# Patient Record
Sex: MALE | Race: WHITE | NOT HISPANIC OR LATINO | Employment: OTHER | URBAN - METROPOLITAN AREA
[De-identification: names, ages, dates, MRNs, and addresses within clinical notes are randomized per-mention and may not be internally consistent; named-entity substitution may affect disease eponyms.]

---

## 2018-10-02 ENCOUNTER — OFFICE VISIT (OUTPATIENT)
Dept: UROLOGY | Facility: CLINIC | Age: 68
End: 2018-10-02
Payer: MEDICARE

## 2018-10-02 VITALS
HEART RATE: 90 BPM | WEIGHT: 190.6 LBS | DIASTOLIC BLOOD PRESSURE: 77 MMHG | SYSTOLIC BLOOD PRESSURE: 123 MMHG | BODY MASS INDEX: 28.89 KG/M2 | HEIGHT: 68 IN | OXYGEN SATURATION: 94 %

## 2018-10-02 DIAGNOSIS — C61 MALIGNANT NEOPLASM OF PROSTATE (H): Primary | ICD-10-CM

## 2018-10-02 DIAGNOSIS — R39.9 LOWER URINARY TRACT SYMPTOMS (LUTS): ICD-10-CM

## 2018-10-02 DIAGNOSIS — E78.00 HIGH CHOLESTEROL: ICD-10-CM

## 2018-10-02 DIAGNOSIS — C61 PROSTATE CANCER (H): Primary | ICD-10-CM

## 2018-10-02 DIAGNOSIS — M19.90 OSTEOARTHRITIS, UNSPECIFIED OSTEOARTHRITIS TYPE, UNSPECIFIED SITE: ICD-10-CM

## 2018-10-02 DIAGNOSIS — D12.6 ADENOMATOUS POLYP OF COLON, UNSPECIFIED PART OF COLON: ICD-10-CM

## 2018-10-02 PROCEDURE — 99204 OFFICE O/P NEW MOD 45 MIN: CPT | Performed by: UROLOGY

## 2018-10-02 RX ORDER — NAPROXEN SODIUM 220 MG
220 TABLET ORAL AT BEDTIME
COMMUNITY

## 2018-10-02 RX ORDER — TAMSULOSIN HYDROCHLORIDE 0.4 MG/1
0.4 CAPSULE ORAL 2 TIMES DAILY
Status: ON HOLD | COMMUNITY
Start: 2018-07-23 | End: 2018-10-12

## 2018-10-02 RX ORDER — POLYETHYLENE GLYCOL 3350 17 G/17G
17 POWDER, FOR SOLUTION ORAL DAILY PRN
COMMUNITY
Start: 2010-04-28

## 2018-10-02 RX ORDER — ACETAMINOPHEN 500 MG
1000 TABLET ORAL PRN
Status: ON HOLD | COMMUNITY
End: 2018-10-12

## 2018-10-02 RX ORDER — SIMVASTATIN 20 MG
10 TABLET ORAL DAILY
Status: ON HOLD | COMMUNITY
Start: 2018-07-21 | End: 2018-10-12

## 2018-10-02 ASSESSMENT — PAIN SCALES - GENERAL: PAINLEVEL: NO PAIN (0)

## 2018-10-02 NOTE — NURSING NOTE
"Pedro Dumont's goals for this visit include:   Chief Complaint   Patient presents with     Consult     Prostate cancer       He requests these members of his care team be copied on today's visit information: Yes    PCP: Gerald Casarez    Referring Provider:  Gerald Casarez  [facility]  [address] [place] [address]    /77  Pulse 90  Ht 1.727 m (5' 8\")  Wt 86.5 kg (190 lb 9.6 oz)  SpO2 94%  BMI 28.98 kg/m2    Do you need any medication refills at today's visit? No    "

## 2018-10-02 NOTE — PROGRESS NOTES
Urology Consult History and Physical  BHAVYA FULLER   Name: Pedro Dumont    MRN: [id number]   YOB: 1950       We were asked to see Pedro Dumont at the request of Dr. Casarez for evaluation and treatment of Prostate cancer.        Chief Complaint:   Prostate cancer    History is obtained from the patient, wife, and outside records            History of Present Illness:   Pedro Dumont is a 67 year old male who is being seen for evaluation of prostate cancer.    He is a very pleasant 68 y/o man who was recently diagnosed with [place] 5+4=9 prostate cancer, PSA 9.1, T1c disease. He was worked up and diagnosed in [place] and scheduled for RALP, BPLND on 10/29 with Dr. Mccarty. He presents today for a second opinion and hopes of an earlier surgery date.     Biopsy done on 8/10 with Dr. Dior:  Volume 43.2    Positive 5/6 on the RIGHT and 2/6 on the LEFT  5+4=9 on left apex 40%  3+3=6 in 6 other biopsies (RB, RM, RA, RB, RA, LA)    Some questionable areas on Bone Scan: posterior 9th rib, cervical, thoracic, and lumbar spine - CT shows areas of degeneration in the spine and no evidence of osseous of lymph node mets. No MRI done.     PSA from outside records:  10/2009     0.9  10/2010     1.56  05/2014     2.04  05/2015     2.54  05/2016     3.97  05/2017     5.85  10/2017     5.93  06/2018     9.14      AUASS: 3-1-0-0-2-1-1 = 8  QOL = Mostly satisfied  SVITLANA = 25/25    (4 or >)  Location: Prostate   Quality: Cancer  Severity: [place] 5+4=9, high risk disease   Duration: Diagnosed in August           Past Medical History:     Past Medical History:   Diagnosis Date     Adenomatous polyp of colon      High cholesterol      Lower urinary tract symptoms (LUTS)      Osteoarthritis      Prostate cancer (H)             Past Surgical History:     Past Surgical History:   Procedure Laterality Date     PAST SURGICAL HISTORY  2004, 2011    Bilateral hip replacement     PAST SURGICAL HISTORY Right 1998    Knee replacement  "           Social History:     Social History   Substance Use Topics     Smoking status: Never Smoker     Smokeless tobacco: Never Used     Alcohol use 3.0 oz/week     5 Cans of beer per week       History   Smoking Status     Never Smoker   Smokeless Tobacco     Never Used            Family History:     Family History   Problem Relation Age of Onset     Arthritis Mother      HEART DISEASE Father               Allergies:   No Known Allergies         Medications:     Current Outpatient Prescriptions   Medication Sig     acetaminophen (TYLENOL) 500 MG tablet Take 1,000 mg by mouth as needed     aspirin 81 MG tablet Take 1 tablet by mouth daily     Calcium Carbonate-Vit D-Min (CALCIUM 1200 PO) Take 1 tablet by mouth daily     Glucosamine-Chondroit-Vit C-Mn (GLUCOSAMINE 1500 COMPLEX PO) Take 2 tablets by mouth daily     Multiple Vitamins-Minerals (CENTRUM CARDIO PO) Take 2 tablets by mouth daily     naproxen sodium (ANAPROX) 220 MG tablet Take 440 mg by mouth as needed     polyethylene glycol (MIRALAX/GLYCOLAX) powder Take 17 g by mouth as needed     simvastatin (ZOCOR) 20 MG tablet Take 10 mg by mouth daily     tamsulosin (FLOMAX) 0.4 MG capsule Take 0.4 mg by mouth daily     No current facility-administered medications for this visit.              Review of Systems:     Skin: negative  Eyes: negative  Ears/Nose/Throat: negative  Respiratory: No shortness of breath, dyspnea on exertion, cough, or hemoptysis  Cardiovascular: negative  Gastrointestinal: negative  Genitourinary: as above  Musculoskeletal: Joint replacement  Neurologic: negative  Psychiatric: negative  Hematologic/Lymphatic/Immunologic: negative  Endocrine: negative          Physical Exam:     Patient Vitals for the past 24 hrs:   BP Pulse SpO2 Height Weight   10/02/18 1357 123/77 90 94 % 1.727 m (5' 8\") 86.5 kg (190 lb 9.6 oz)     Body mass index is 28.98 kg/(m^2).     General: age-appropriate appearing male in NAD  HEENT: Head AT/NC, EOMI, CN Grossly " intact  Lungs: no respiratory distress, or pursed lip breathing  Heart: No obvious jugular venous distension present  Back: no bony midline tenderness, no CVAT bilaterally.  Abdomen: soft, non-distended, non-tender. No organomegaly  : deferred to OR  Lymph: no palpable inguinal lymphadenopathy.  LE: no edema.   Musculoskeltal: extremities normal, no peripheral edema  Skin: no suspicious lesions or rashes  Neuro: Alert, oriented, speech and mentation normal; moving all 4 extremities equally.  Psych: affect and mood normal          Data:   All laboratory data reviewed:  Outside records reviewed from [facility]  More than 60 pages of records personally reviewed    PSA from outside records:  10/2009     0.9  10/2010     1.56  05/2014     2.04  05/2015     2.54  05/2016     3.97  05/2017     5.85  10/2017     5.93  06/2018     9.14    ALK P'TASE  8/31/2018     69    SCr  8/24/2018     0.92      Prostate biopsy pathology:  DIAGNOSIS:  PROSTATE NEEDLE BIOPSIES  A.  RIGHT LATERAL BASE       --INVASIVE ADENOCARCINOMA, RAFAEL SCORE 3+3=6 (GRADE GROUP 1),          40% OF SUBMITTED TISSUE  B.  RIGHT LATERAL MID       --INVASIVE ADENOCARCINOMA, RAFAEL SCORE 3+3=6 (GRADE GROUP 1),          30% OF SUBMITTED TISSUE  C.  RIGHT LATERAL APEX       --INVASIVE ADENOCARCINOMA, RAFAEL SCORE 3+3=6 (GRADE GROUP 1),          60% OF SUBMITTED TISSUE  D.  RIGHT BASE       --INVASIVE ADENOCARCINOMA, RAFAEL SCORE 3+3=6 (GRADE GROUP 1),          <5% OF SUBMITTED TISSUE  F.  RIGHT APEX       --INVASIVE ADENOCARCINOMA, RAFAEL SCORE 3+3=6 (GRADE GROUP 1),          15% OF SUBMITTED TISSUE  I.  LEFT LATERAL APEX       --INVASIVE ADENOCARCINOMA, RAFAEL SCORE 3+3=6 (GRADE GROUP 1),          <5% OF SUBMITTED TISSUE  L.  LEFT APEX       --INVASIVE ADENOCARCINOMA, RAFAEL SCORE 5+4=9 (GRADE GROUP 5),          40% OF SUBMITTED TISSUE      E.  RIGHT MID - Benign prostatic tissue  G.  LEFT LATERAL BASE - Benign prostatic tissue  H.  LEFT LATERAL MID -  Benign prostatic tissue   J.  LEFT BASE - Benign prostatic tissue  K.  LEFT MID - Benign prostatic tissue    All pertinent imaging reviewed:  Imaging reports from [facility] reviewed    NM Bone Scan 8/24/2018  FINDINGS:  Total body bone scan is performed.    There are foci of increased activity seen at both shoulders, the  sternoclavicular joints, and both knees.  These areas correspond with likely  degenerative change.  There is photopenia is seen the left hip corresponding  with previous arthroplasty.  There is some mild photopenia at the joint space  of the right hip an additional hardware may be present at this level.    There is a focus of in key increased activity seen on the left in the mid  cervical spine and on the left in the upper thoracic spine.  Additional focus  of activity is seen along the posterior aspect of the right 9th rib.  Additional activity is seen at the right aspect of L4 and L5.  These areas of  activity in the spine may be degenerative.  However, correlation with other  imaging, such as radiographs, would be recommended to assure this.    IMPRESSION:  Multiple areas of degenerative change.    Foci of activity in the cervical, thoracic and lumbar spine as well as in the  posterior right 9th rib.  These may be degenerative in nature.  However,  correlation with a radiographs or recent previous imaging would be recommended.    CT ABD/PEL 8/24/2018  FINDINGS:  There is significant spine degenerative changes including mid to lower thoracic  spine lumbar spine which a could account for increased activity on the bone  scan.        Calcified lung granulomas.  Mild bibasilar linear atelectasis/scarring.  Normal  appearing liver gallbladder kidneys pancreas and spleen.  There is a small  splenule.  Tiny renal cyst.  Patient has a bilateral hip total arthroplasties  and there is significant atrophy of left iliopsoas.  Small umbilical hernia  containing fat..  The total hip arthroplasties obscure the  prostate and  portions of the internal iliac akilah regions.  Negative for convincing internal  external common iliac or periaortic adenopathy.  Diverticulosis.    IMPRESSION:  Spine degenerative changes appear to account for nuclear medicine bone activity  without convincing osseous or akilah metastatic disease.           Impression and Plan:   Impression:   68 y/o man with high risk prostate cancer. [place] 5+4=9 in single core from LEFT apex, rest of cores 3+3=6, PSA 9.1. Metastatic evaluation negative.      Plan:   High risk prostate cancer    DISCUSSION PROSTATE CANCER     The natural history of this disease was explained to the patient at length and the treatment options discussed including radical prostatectomy by open or robotic-assisted laparoscopic approach, including the possible need for post surgery radiation, including the probability of success and complications associated with these approaches.    With radiation therapy, we discussed the difficulties in diagnosing recurrent disease at an early stage due to variability in PSA levels and the fact that most patients are not candidates for local salvage therapy when biochemical recurrence is declared.  We also discussed the significant morbidity of local salvage therapy in terms of perioperative complications, erectile dysfunction and urinary incontinence.    With respect to radical prostatectomy, the pros and cons of open vs robotic-assisted laparoscopic prostatectomy were discussed. The complications of this procedure were reviewed with the patient and include (but not limited to) urinary incontinence, erectile dysfunction, infertility, anastomotic stricture, lymphocele, hemorrhage requiring transfusion, rectal, ureteral, or nerve injury, infection, cardiovascular, pulmonary, thromboembolic, and anesthetic complications.  For robotic prostatectomy, the additional complications of anastomotic urine leak and small bowel obstruction from adhesions was  conveyed. The anticipated post-operative course was explained, including an anticipated 1-2 day(s) hospital stay.    With surgery, we also discussed the potential advantage over radiation therapy in that biochemical recurrence can be detected at a relatively earlier stage and that salvage radiotherapy is successful in controlling recurrent disease in a substantial proportion of patients.  I also conveyed that salvage radiotherapy was associated with a considerably more favorable morbidity profile compared to local salvage therapies for radiorecurent disease.     All questions were answered.  I asked him to call if any additional questions or concerns or if he wanted to proceed with therapy with us.    We discussed that we could offer a surgery date of 10/12 at [facility].     Patient has decided he would like to proceed with Robotic Radical Prostatectomy - 85978    The risks, benefits, alternatives, and personnel involved in the procudure were discussed.  All questions were answered.  A written informed consent will be finalized on the morning of the procedure.    Plan:  - We will work to obtain images from [facility]  - We will discuss obtaining a prostate MRI for pre-op planning         Thank you for the kind consultation.    Time spent: 45 minutes of which >50% was spent counseling.    Tristin Hughes MD   Urology  [facility]  [facility] Phone: [phone]  [facility] Phone: [phone]

## 2018-10-02 NOTE — LETTER
10/2/2018         RE: Pedro Dumont  [address] [place] [id number]        Dear Colleague,    Thank you for referring your patient, Pedro Dumont, to the [email or web address] Please see a copy of my visit note below.    Urology Consult History and Physical  BHAVYA GROVE   Name: Pedro Dumont    MRN: [id number]   YOB: 1950       We were asked to see Pedro Dumont at the request of Dr. Casarez for evaluation and treatment of Prostate cancer.        Chief Complaint:   Prostate cancer    History is obtained from the patient, wife, and outside records            History of Present Illness:   Pedro Dumont is a 67 year old male who is being seen for evaluation of prostate cancer.    He is a very pleasant 66 y/o man who was recently diagnosed with [place] 5+4=9 prostate cancer, PSA 9.1, T1c disease. He was worked up and diagnosed in [place] and scheduled for RALP, BPLND on 10/29 with Dr. Mccarty. He presents today for a second opinion and hopes of an earlier surgery date.     Biopsy done on 8/10 with Dr. Dior:  Volume 43.2    Positive 5/6 on the RIGHT and 2/6 on the LEFT  5+4=9 on left apex 40%  3+3=6 in 6 other biopsies (RB, RM, RA, RB, RA, LA)    Some questionable areas on Bone Scan: posterior 9th rib, cervical, thoracic, and lumbar spine - CT shows areas of degeneration in the spine and no evidence of osseous of lymph node mets. No MRI done.     PSA from outside records:  10/2009     0.9  10/2010     1.56  05/2014     2.04  05/2015     2.54  05/2016     3.97  05/2017     5.85  10/2017     5.93  06/2018     9.14      AUASS: 3-1-0-0-2-1-1 = 8  QOL = Mostly satisfied  SVITLANA = 25/25    (4 or >)  Location: Prostate   Quality: Cancer  Severity: [place] 5+4=9, high risk disease   Duration: Diagnosed in August           Past Medical History:     Past Medical History:   Diagnosis Date     Adenomatous polyp of colon      High cholesterol      Lower urinary tract symptoms (LUTS)       Osteoarthritis      Prostate cancer (H)             Past Surgical History:     Past Surgical History:   Procedure Laterality Date     PAST SURGICAL HISTORY  2004, 2011    Bilateral hip replacement     PAST SURGICAL HISTORY Right 1998    Knee replacement            Social History:     Social History   Substance Use Topics     Smoking status: Never Smoker     Smokeless tobacco: Never Used     Alcohol use 3.0 oz/week     5 Cans of beer per week       History   Smoking Status     Never Smoker   Smokeless Tobacco     Never Used            Family History:     Family History   Problem Relation Age of Onset     Arthritis Mother      HEART DISEASE Father               Allergies:   No Known Allergies         Medications:     Current Outpatient Prescriptions   Medication Sig     acetaminophen (TYLENOL) 500 MG tablet Take 1,000 mg by mouth as needed     aspirin 81 MG tablet Take 1 tablet by mouth daily     Calcium Carbonate-Vit D-Min (CALCIUM 1200 PO) Take 1 tablet by mouth daily     Glucosamine-Chondroit-Vit C-Mn (GLUCOSAMINE 1500 COMPLEX PO) Take 2 tablets by mouth daily     Multiple Vitamins-Minerals (CENTRUM CARDIO PO) Take 2 tablets by mouth daily     naproxen sodium (ANAPROX) 220 MG tablet Take 440 mg by mouth as needed     polyethylene glycol (MIRALAX/GLYCOLAX) powder Take 17 g by mouth as needed     simvastatin (ZOCOR) 20 MG tablet Take 10 mg by mouth daily     tamsulosin (FLOMAX) 0.4 MG capsule Take 0.4 mg by mouth daily     No current facility-administered medications for this visit.              Review of Systems:     Skin: negative  Eyes: negative  Ears/Nose/Throat: negative  Respiratory: No shortness of breath, dyspnea on exertion, cough, or hemoptysis  Cardiovascular: negative  Gastrointestinal: negative  Genitourinary: as above  Musculoskeletal: Joint replacement  Neurologic: negative  Psychiatric: negative  Hematologic/Lymphatic/Immunologic: negative  Endocrine: negative          Physical Exam:     Patient  "Vitals for the past 24 hrs:   BP Pulse SpO2 Height Weight   10/02/18 1357 123/77 90 94 % 1.727 m (5' 8\") 86.5 kg (190 lb 9.6 oz)     Body mass index is 28.98 kg/(m^2).     General: age-appropriate appearing male in NAD  HEENT: Head AT/NC, EOMI, CN Grossly intact  Lungs: no respiratory distress, or pursed lip breathing  Heart: No obvious jugular venous distension present  Back: no bony midline tenderness, no CVAT bilaterally.  Abdomen: soft, non-distended, non-tender. No organomegaly  : deferred to OR  Lymph: no palpable inguinal lymphadenopathy.  LE: no edema.   Musculoskeltal: extremities normal, no peripheral edema  Skin: no suspicious lesions or rashes  Neuro: Alert, oriented, speech and mentation normal; moving all 4 extremities equally.  Psych: affect and mood normal          Data:   All laboratory data reviewed:  Outside records reviewed from [facility]  More than 60 pages of records personally reviewed    PSA from outside records:  10/2009     0.9  10/2010     1.56  05/2014     2.04  05/2015     2.54  05/2016     3.97  05/2017     5.85  10/2017     5.93  06/2018     9.14    ALK P'TASE  8/31/2018     69    SCr  8/24/2018     0.92      Prostate biopsy pathology:  DIAGNOSIS:  PROSTATE NEEDLE BIOPSIES  A.  RIGHT LATERAL BASE       --INVASIVE ADENOCARCINOMA, RAFAEL SCORE 3+3=6 (GRADE GROUP 1),          40% OF SUBMITTED TISSUE  B.  RIGHT LATERAL MID       --INVASIVE ADENOCARCINOMA, RAFAEL SCORE 3+3=6 (GRADE GROUP 1),          30% OF SUBMITTED TISSUE  C.  RIGHT LATERAL APEX       --INVASIVE ADENOCARCINOMA, RAFAEL SCORE 3+3=6 (GRADE GROUP 1),          60% OF SUBMITTED TISSUE  D.  RIGHT BASE       --INVASIVE ADENOCARCINOMA, RAFAEL SCORE 3+3=6 (GRADE GROUP 1),          <5% OF SUBMITTED TISSUE  F.  RIGHT APEX       --INVASIVE ADENOCARCINOMA, RAFAEL SCORE 3+3=6 (GRADE GROUP 1),          15% OF SUBMITTED TISSUE  I.  LEFT LATERAL APEX       --INVASIVE ADENOCARCINOMA, RAFAEL SCORE 3+3=6 (GRADE GROUP 1),          <5% " OF SUBMITTED TISSUE  L.  LEFT APEX       --INVASIVE ADENOCARCINOMA, RAFAEL SCORE 5+4=9 (GRADE GROUP 5),          40% OF SUBMITTED TISSUE      E.  RIGHT MID - Benign prostatic tissue  G.  LEFT LATERAL BASE - Benign prostatic tissue  H.  LEFT LATERAL MID - Benign prostatic tissue   J.  LEFT BASE - Benign prostatic tissue  K.  LEFT MID - Benign prostatic tissue    All pertinent imaging reviewed:  Imaging reports from [facility] reviewed    NM Bone Scan 8/24/2018  FINDINGS:  Total body bone scan is performed.    There are foci of increased activity seen at both shoulders, the  sternoclavicular joints, and both knees.  These areas correspond with likely  degenerative change.  There is photopenia is seen the left hip corresponding  with previous arthroplasty.  There is some mild photopenia at the joint space  of the right hip an additional hardware may be present at this level.    There is a focus of in key increased activity seen on the left in the mid  cervical spine and on the left in the upper thoracic spine.  Additional focus  of activity is seen along the posterior aspect of the right 9th rib.  Additional activity is seen at the right aspect of L4 and L5.  These areas of  activity in the spine may be degenerative.  However, correlation with other  imaging, such as radiographs, would be recommended to assure this.    IMPRESSION:  Multiple areas of degenerative change.    Foci of activity in the cervical, thoracic and lumbar spine as well as in the  posterior right 9th rib.  These may be degenerative in nature.  However,  correlation with a radiographs or recent previous imaging would be recommended.    CT ABD/PEL 8/24/2018  FINDINGS:  There is significant spine degenerative changes including mid to lower thoracic  spine lumbar spine which a could account for increased activity on the bone  scan.        Calcified lung granulomas.  Mild bibasilar linear atelectasis/scarring.  Normal  appearing liver gallbladder  kidneys pancreas and spleen.  There is a small  splenule.  Tiny renal cyst.  Patient has a bilateral hip total arthroplasties  and there is significant atrophy of left iliopsoas.  Small umbilical hernia  containing fat..  The total hip arthroplasties obscure the prostate and  portions of the internal iliac akilah regions.  Negative for convincing internal  external common iliac or periaortic adenopathy.  Diverticulosis.    IMPRESSION:  Spine degenerative changes appear to account for nuclear medicine bone activity  without convincing osseous or akilah metastatic disease.           Impression and Plan:   Impression:   66 y/o man with high risk prostate cancer. [place] 5+4=9 in single core from LEFT apex, rest of cores 3+3=6, PSA 9.1. Metastatic evaluation negative.      Plan:   High risk prostate cancer    DISCUSSION PROSTATE CANCER     The natural history of this disease was explained to the patient at length and the treatment options discussed including radical prostatectomy by open or robotic-assisted laparoscopic approach, including the possible need for post surgery radiation, including the probability of success and complications associated with these approaches.    With radiation therapy, we discussed the difficulties in diagnosing recurrent disease at an early stage due to variability in PSA levels and the fact that most patients are not candidates for local salvage therapy when biochemical recurrence is declared.  We also discussed the significant morbidity of local salvage therapy in terms of perioperative complications, erectile dysfunction and urinary incontinence.    With respect to radical prostatectomy, the pros and cons of open vs robotic-assisted laparoscopic prostatectomy were discussed. The complications of this procedure were reviewed with the patient and include (but not limited to) urinary incontinence, erectile dysfunction, infertility, anastomotic stricture, lymphocele, hemorrhage requiring  transfusion, rectal, ureteral, or nerve injury, infection, cardiovascular, pulmonary, thromboembolic, and anesthetic complications.  For robotic prostatectomy, the additional complications of anastomotic urine leak and small bowel obstruction from adhesions was conveyed. The anticipated post-operative course was explained, including an anticipated 1-2 day(s) hospital stay.    With surgery, we also discussed the potential advantage over radiation therapy in that biochemical recurrence can be detected at a relatively earlier stage and that salvage radiotherapy is successful in controlling recurrent disease in a substantial proportion of patients.  I also conveyed that salvage radiotherapy was associated with a considerably more favorable morbidity profile compared to local salvage therapies for radiorecurent disease.     All questions were answered.  I asked him to call if any additional questions or concerns or if he wanted to proceed with therapy with us.    We discussed that we could offer a surgery date of 10/12 at [facility].     Patient has decided he would like to proceed with Robotic Radical Prostatectomy - 54412    The risks, benefits, alternatives, and personnel involved in the procudure were discussed.  All questions were answered.  A written informed consent will be finalized on the morning of the procedure.    Plan:  - We will work to obtain images from [facility]  - We will discuss obtaining a prostate MRI for pre-op planning         Thank you for the kind consultation.    Time spent: 45 minutes of which >50% was spent counseling.    Tristin Hughes MD   Urology  [facility]  [facility] Phone: [phone]  [facility] Phone: [phone]         Again, thank you for allowing me to participate in the care of your patient.        Sincerely,        Tristin Hughes MD

## 2018-10-02 NOTE — MR AVS SNAPSHOT
After Visit Summary   10/2/2018    Pedro Dumont    MRN: [id number]           Patient Information     Date Of Birth          1950        Visit Information        Provider Department      10/2/2018 2:00 PM Tristin Hughes MD [facility]        Today's Diagnoses     Prostate cancer (H)    -  1    Lower urinary tract symptoms (LUTS)        Osteoarthritis, unspecified osteoarthritis type, unspecified site        High cholesterol        Adenomatous polyp of colon, unspecified part of colon           Follow-ups after your visit        Your next 10 appointments already scheduled     Oct 12, 2018   Procedure with Tristin Hughes MD   [facility] (--)    [address]  [facility] [id number] [phone]            Oct 24, 2018 10:30 AM CDT   Return Visit with Tristin Hughes MD   [facility] ([facility])    [address]  [address]  [facility] [id number] [phone]              Who to contact     If you have questions or need follow up information about today's clinic visit or your schedule please contact [facility] directly at [phone].  Normal or non-critical lab and imaging results will be communicated to you by MyChart, letter or phone within 4 business days after the clinic has received the results. If you do not hear from us within 7 days, please contact the clinic through [employer] or phone. If you have a critical or abnormal lab result, we will notify you by phone as soon as possible.  Submit refill requests through FanXT or call your pharmacy and they will forward the refill request to us. Please allow 3 business days for your refill to be completed.          Additional Information About Your Visit        [employer] Information     FanXT is an electronic gateway that provides easy, online access to your medical records. With FanXT, you can request a clinic  "appointment, read your test results, renew a prescription or communicate with your care team.     To sign up for [employer] visit the website at [email or web address]   You will be asked to enter the access code listed below, as well as some personal information. Please follow the directions to create your username and password.     Your access code is: [email or web address]  Expires: 2018  8:52 PM     Your access code will  in 90 days. If you need help or a new code, please contact your [facility] or call [phone] for assistance.        Care EveryWhere ID     This is your Care EveryWhere ID. This could be used by other organizations to access your [place] medical records  [id number]        Your Vitals Were     Pulse Height Pulse Oximetry BMI (Body Mass Index)          90 1.727 m (5' 8\") 94% 28.98 kg/m2         Blood Pressure from Last 3 Encounters:   10/02/18 123/77    Weight from Last 3 Encounters:   10/02/18 86.5 kg (190 lb 9.6 oz)              Today, you had the following     No orders found for display       Primary Care Provider Office Phone # Fax #    Gerald Casarez [phone]       [place] [address]        Equal Access to Services     MARK ANTHONY JOSE AH: Hadii quentin ku hadasho Soomaali, waaxda luqadaha, qaybta kaalmada adeegyada, waxay idiin hayaan fabrice ha . So [facility] [phone].    ATENCIÓN: Si habla español, tiene a deluna disposición servicios gratuitos de asistencia lingüística. Llame al [phone].    We comply with applicable federal civil rights laws and [place] laws. We do not discriminate on the basis of race, color, national origin, age, disability, sex, sexual orientation, or gender identity.            Thank you!     Thank you for choosing [facility]  for your care. Our goal is always to provide you with excellent care. Hearing back from our patients is one way we can continue to " improve our services. Please take a few minutes to complete the written survey that you may receive in the mail after your visit with us. Thank you!             Your Updated Medication List - Protect others around you: Learn how to safely use, store and throw away your medicines at www.disposemymeds.org.          This list is accurate as of 10/2/18  8:52 PM.  Always use your most recent med list.                   Brand Name Dispense Instructions for use Diagnosis    acetaminophen 500 MG tablet    TYLENOL     Take 1,000 mg by mouth as needed        aspirin 81 MG tablet      Take 1 tablet by mouth daily        CALCIUM 1200 PO      Take 1 tablet by mouth daily        CENTRUM CARDIO PO      Take 2 tablets by mouth daily        GLUCOSAMINE 1500 COMPLEX PO      Take 2 tablets by mouth daily        naproxen sodium 220 MG tablet    ANAPROX     Take 440 mg by mouth as needed        polyethylene glycol powder    MIRALAX/GLYCOLAX     Take 17 g by mouth as needed        simvastatin 20 MG tablet    ZOCOR     Take 10 mg by mouth daily        tamsulosin 0.4 MG capsule    FLOMAX     Take 0.4 mg by mouth daily

## 2018-10-12 ENCOUNTER — SURGERY (OUTPATIENT)
Age: 68
End: 2018-10-12

## 2018-10-12 ENCOUNTER — HOSPITAL ENCOUNTER (OUTPATIENT)
Facility: CLINIC | Age: 68
Discharge: HOME | End: 2018-10-13
Attending: UROLOGY | Admitting: UROLOGY
Payer: MEDICARE

## 2018-10-12 ENCOUNTER — ANESTHESIA EVENT (OUTPATIENT)
Dept: SURGERY | Facility: CLINIC | Age: 68
End: 2018-10-12
Payer: MEDICARE

## 2018-10-12 ENCOUNTER — ANESTHESIA (OUTPATIENT)
Dept: SURGERY | Facility: CLINIC | Age: 68
End: 2018-10-12
Payer: MEDICARE

## 2018-10-12 DIAGNOSIS — C61 PROSTATE CANCER (H): Primary | ICD-10-CM

## 2018-10-12 LAB
ABO + RH BLD: NORMAL
ABO + RH BLD: NORMAL
ANION GAP SERPL CALCULATED.3IONS-SCNC: 7 MMOL/L (ref 3–14)
BLD GP AB SCN SERPL QL: NORMAL
BLOOD BANK CMNT PATIENT-IMP: NORMAL
BUN SERPL-MCNC: 15 MG/DL (ref 7–30)
CALCIUM SERPL-MCNC: 8.1 MG/DL (ref 8.5–10.1)
CHLORIDE SERPL-SCNC: 111 MMOL/L (ref 94–109)
CO2 SERPL-SCNC: 26 MMOL/L (ref 20–32)
CREAT SERPL-MCNC: 1.01 MG/DL (ref 0.66–1.25)
ERYTHROCYTE [DISTWIDTH] IN BLOOD BY AUTOMATED COUNT: 13.1 % (ref 10–15)
GFR SERPL CREATININE-BSD FRML MDRD: 73 ML/MIN/1.7M2
GLUCOSE SERPL-MCNC: 144 MG/DL (ref 70–99)
HCT VFR BLD AUTO: 39.2 % (ref 40–53)
HGB BLD-MCNC: 13.2 G/DL (ref 13.3–17.7)
MCH RBC QN AUTO: 30.9 PG (ref 26.5–33)
MCHC RBC AUTO-ENTMCNC: 33.7 G/DL (ref 31.5–36.5)
MCV RBC AUTO: 92 FL (ref 78–100)
PLATELET # BLD AUTO: 210 10E9/L (ref 150–450)
POTASSIUM SERPL-SCNC: 4 MMOL/L (ref 3.4–5.3)
RBC # BLD AUTO: 4.27 10E12/L (ref 4.4–5.9)
SODIUM SERPL-SCNC: 144 MMOL/L (ref 133–144)
SPECIMEN EXP DATE BLD: NORMAL
WBC # BLD AUTO: 14.9 10E9/L (ref 4–11)

## 2018-10-12 PROCEDURE — 88309 TISSUE EXAM BY PATHOLOGIST: CPT | Mod: 26 | Performed by: UROLOGY

## 2018-10-12 PROCEDURE — 27210794 ZZH OR GENERAL SUPPLY STERILE: Performed by: UROLOGY

## 2018-10-12 PROCEDURE — 25000128 H RX IP 250 OP 636: Performed by: NURSE ANESTHETIST, CERTIFIED REGISTERED

## 2018-10-12 PROCEDURE — 25000132 ZZH RX MED GY IP 250 OP 250 PS 637: Mod: GY | Performed by: STUDENT IN AN ORGANIZED HEALTH CARE EDUCATION/TRAINING PROGRAM

## 2018-10-12 PROCEDURE — 25000125 ZZHC RX 250: Performed by: UROLOGY

## 2018-10-12 PROCEDURE — 36000085 ZZH SURGERY LEVEL 8 1ST 30 MIN: Performed by: UROLOGY

## 2018-10-12 PROCEDURE — 25000128 H RX IP 250 OP 636: Performed by: ANESTHESIOLOGY

## 2018-10-12 PROCEDURE — 55866 LAPS SURG PRST8ECT RPBIC RAD: CPT | Performed by: UROLOGY

## 2018-10-12 PROCEDURE — 25000566 ZZH SEVOFLURANE, EA 15 MIN: Performed by: UROLOGY

## 2018-10-12 PROCEDURE — 25000125 ZZHC RX 250: Performed by: ANESTHESIOLOGY

## 2018-10-12 PROCEDURE — 36415 COLL VENOUS BLD VENIPUNCTURE: CPT | Performed by: UROLOGY

## 2018-10-12 PROCEDURE — 25000125 ZZHC RX 250: Performed by: NURSE ANESTHETIST, CERTIFIED REGISTERED

## 2018-10-12 PROCEDURE — 80048 BASIC METABOLIC PNL TOTAL CA: CPT | Performed by: STUDENT IN AN ORGANIZED HEALTH CARE EDUCATION/TRAINING PROGRAM

## 2018-10-12 PROCEDURE — 38571 LAPAROSCOPY LYMPHADENECTOMY: CPT | Mod: 51 | Performed by: UROLOGY

## 2018-10-12 PROCEDURE — 37000008 ZZH ANESTHESIA TECHNICAL FEE, 1ST 30 MIN: Performed by: UROLOGY

## 2018-10-12 PROCEDURE — 37000009 ZZH ANESTHESIA TECHNICAL FEE, EACH ADDTL 15 MIN: Performed by: UROLOGY

## 2018-10-12 PROCEDURE — 71000012 ZZH RECOVERY PHASE 1 LEVEL 1 FIRST HR: Performed by: UROLOGY

## 2018-10-12 PROCEDURE — 86900 BLOOD TYPING SEROLOGIC ABO: CPT | Performed by: UROLOGY

## 2018-10-12 PROCEDURE — 36000087 ZZH SURGERY LEVEL 8 EA 15 ADDTL MIN: Performed by: UROLOGY

## 2018-10-12 PROCEDURE — 88331 PATH CONSLTJ SURG 1 BLK 1SPC: CPT | Performed by: UROLOGY

## 2018-10-12 PROCEDURE — 88305 TISSUE EXAM BY PATHOLOGIST: CPT | Performed by: UROLOGY

## 2018-10-12 PROCEDURE — 86901 BLOOD TYPING SEROLOGIC RH(D): CPT | Performed by: UROLOGY

## 2018-10-12 PROCEDURE — A9270 NON-COVERED ITEM OR SERVICE: HCPCS | Mod: GY | Performed by: STUDENT IN AN ORGANIZED HEALTH CARE EDUCATION/TRAINING PROGRAM

## 2018-10-12 PROCEDURE — 85027 COMPLETE CBC AUTOMATED: CPT | Performed by: STUDENT IN AN ORGANIZED HEALTH CARE EDUCATION/TRAINING PROGRAM

## 2018-10-12 PROCEDURE — 36415 COLL VENOUS BLD VENIPUNCTURE: CPT | Performed by: STUDENT IN AN ORGANIZED HEALTH CARE EDUCATION/TRAINING PROGRAM

## 2018-10-12 PROCEDURE — 88305 TISSUE EXAM BY PATHOLOGIST: CPT | Mod: 26 | Performed by: UROLOGY

## 2018-10-12 PROCEDURE — 25800025 ZZH RX 258: Performed by: UROLOGY

## 2018-10-12 PROCEDURE — 88331 PATH CONSLTJ SURG 1 BLK 1SPC: CPT | Mod: 26 | Performed by: UROLOGY

## 2018-10-12 PROCEDURE — 25000128 H RX IP 250 OP 636: Performed by: UROLOGY

## 2018-10-12 PROCEDURE — 88309 TISSUE EXAM BY PATHOLOGIST: CPT | Performed by: UROLOGY

## 2018-10-12 PROCEDURE — 86850 RBC ANTIBODY SCREEN: CPT | Performed by: UROLOGY

## 2018-10-12 PROCEDURE — 71000013 ZZH RECOVERY PHASE 1 LEVEL 1 EA ADDTL HR: Performed by: UROLOGY

## 2018-10-12 PROCEDURE — 25000128 H RX IP 250 OP 636: Performed by: STUDENT IN AN ORGANIZED HEALTH CARE EDUCATION/TRAINING PROGRAM

## 2018-10-12 PROCEDURE — 40000170 ZZH STATISTIC PRE-PROCEDURE ASSESSMENT II: Performed by: UROLOGY

## 2018-10-12 RX ORDER — SODIUM CHLORIDE, SODIUM LACTATE, POTASSIUM CHLORIDE, CALCIUM CHLORIDE 600; 310; 30; 20 MG/100ML; MG/100ML; MG/100ML; MG/100ML
INJECTION, SOLUTION INTRAVENOUS CONTINUOUS
Status: DISCONTINUED | OUTPATIENT
Start: 2018-10-12 | End: 2018-10-13 | Stop reason: HOSPADM

## 2018-10-12 RX ORDER — SODIUM CHLORIDE, SODIUM LACTATE, POTASSIUM CHLORIDE, CALCIUM CHLORIDE 600; 310; 30; 20 MG/100ML; MG/100ML; MG/100ML; MG/100ML
INJECTION, SOLUTION INTRAVENOUS CONTINUOUS
Status: DISCONTINUED | OUTPATIENT
Start: 2018-10-12 | End: 2018-10-12 | Stop reason: HOSPADM

## 2018-10-12 RX ORDER — CEFAZOLIN SODIUM 1 G/3ML
1 INJECTION, POWDER, FOR SOLUTION INTRAMUSCULAR; INTRAVENOUS SEE ADMIN INSTRUCTIONS
Status: DISCONTINUED | OUTPATIENT
Start: 2018-10-12 | End: 2018-10-12 | Stop reason: HOSPADM

## 2018-10-12 RX ORDER — POLYETHYLENE GLYCOL 3350 17 G/17G
17 POWDER, FOR SOLUTION ORAL DAILY
Status: DISCONTINUED | OUTPATIENT
Start: 2018-10-12 | End: 2018-10-13 | Stop reason: HOSPADM

## 2018-10-12 RX ORDER — BUPIVACAINE HYDROCHLORIDE 2.5 MG/ML
INJECTION, SOLUTION INFILTRATION; PERINEURAL PRN
Status: DISCONTINUED | OUTPATIENT
Start: 2018-10-12 | End: 2018-10-12 | Stop reason: HOSPADM

## 2018-10-12 RX ORDER — HEPARIN SODIUM 5000 [USP'U]/.5ML
5000 INJECTION, SOLUTION INTRAVENOUS; SUBCUTANEOUS ONCE
Status: COMPLETED | OUTPATIENT
Start: 2018-10-12 | End: 2018-10-12

## 2018-10-12 RX ORDER — GLYCOPYRROLATE 0.2 MG/ML
INJECTION, SOLUTION INTRAMUSCULAR; INTRAVENOUS PRN
Status: DISCONTINUED | OUTPATIENT
Start: 2018-10-12 | End: 2018-10-12

## 2018-10-12 RX ORDER — OXYCODONE HYDROCHLORIDE 5 MG/1
5 TABLET ORAL EVERY 6 HOURS PRN
Qty: 20 TABLET | Refills: 0 | Status: SHIPPED | OUTPATIENT
Start: 2018-10-12

## 2018-10-12 RX ORDER — ACETAMINOPHEN 325 MG/1
975 TABLET ORAL EVERY 8 HOURS
Status: DISCONTINUED | OUTPATIENT
Start: 2018-10-12 | End: 2018-10-13 | Stop reason: HOSPADM

## 2018-10-12 RX ORDER — NEOSTIGMINE METHYLSULFATE 1 MG/ML
VIAL (ML) INJECTION PRN
Status: DISCONTINUED | OUTPATIENT
Start: 2018-10-12 | End: 2018-10-12

## 2018-10-12 RX ORDER — PROPOFOL 10 MG/ML
INJECTION, EMULSION INTRAVENOUS PRN
Status: DISCONTINUED | OUTPATIENT
Start: 2018-10-12 | End: 2018-10-12

## 2018-10-12 RX ORDER — ONDANSETRON 4 MG/1
4 TABLET, ORALLY DISINTEGRATING ORAL EVERY 30 MIN PRN
Status: DISCONTINUED | OUTPATIENT
Start: 2018-10-12 | End: 2018-10-12 | Stop reason: HOSPADM

## 2018-10-12 RX ORDER — CIPROFLOXACIN 500 MG/1
500 TABLET, FILM COATED ORAL ONCE
Qty: 1 TABLET | Refills: 0 | Status: SHIPPED | OUTPATIENT
Start: 2018-10-12 | End: 2018-10-12

## 2018-10-12 RX ORDER — HEPARIN SODIUM 5000 [USP'U]/.5ML
5000 INJECTION, SOLUTION INTRAVENOUS; SUBCUTANEOUS EVERY 12 HOURS
Status: DISCONTINUED | OUTPATIENT
Start: 2018-10-12 | End: 2018-10-13 | Stop reason: HOSPADM

## 2018-10-12 RX ORDER — EPHEDRINE SULFATE 50 MG/ML
INJECTION, SOLUTION INTRAMUSCULAR; INTRAVENOUS; SUBCUTANEOUS PRN
Status: DISCONTINUED | OUTPATIENT
Start: 2018-10-12 | End: 2018-10-12

## 2018-10-12 RX ORDER — HYDROMORPHONE HYDROCHLORIDE 1 MG/ML
.3-.5 INJECTION, SOLUTION INTRAMUSCULAR; INTRAVENOUS; SUBCUTANEOUS EVERY 5 MIN PRN
Status: DISCONTINUED | OUTPATIENT
Start: 2018-10-12 | End: 2018-10-12 | Stop reason: HOSPADM

## 2018-10-12 RX ORDER — KETOROLAC TROMETHAMINE 5 MG/ML
1 SOLUTION OPHTHALMIC 2 TIMES DAILY
Status: DISCONTINUED | OUTPATIENT
Start: 2018-10-12 | End: 2018-10-13 | Stop reason: HOSPADM

## 2018-10-12 RX ORDER — ONDANSETRON 2 MG/ML
4 INJECTION INTRAMUSCULAR; INTRAVENOUS EVERY 6 HOURS PRN
Status: DISCONTINUED | OUTPATIENT
Start: 2018-10-12 | End: 2018-10-13 | Stop reason: HOSPADM

## 2018-10-12 RX ORDER — DEXAMETHASONE SODIUM PHOSPHATE 4 MG/ML
INJECTION, SOLUTION INTRA-ARTICULAR; INTRALESIONAL; INTRAMUSCULAR; INTRAVENOUS; SOFT TISSUE PRN
Status: DISCONTINUED | OUTPATIENT
Start: 2018-10-12 | End: 2018-10-12

## 2018-10-12 RX ORDER — ONDANSETRON 2 MG/ML
INJECTION INTRAMUSCULAR; INTRAVENOUS PRN
Status: DISCONTINUED | OUTPATIENT
Start: 2018-10-12 | End: 2018-10-12

## 2018-10-12 RX ORDER — CEFAZOLIN SODIUM 2 G/100ML
2 INJECTION, SOLUTION INTRAVENOUS
Status: COMPLETED | OUTPATIENT
Start: 2018-10-12 | End: 2018-10-12

## 2018-10-12 RX ORDER — ONDANSETRON 2 MG/ML
4 INJECTION INTRAMUSCULAR; INTRAVENOUS EVERY 30 MIN PRN
Status: DISCONTINUED | OUTPATIENT
Start: 2018-10-12 | End: 2018-10-12 | Stop reason: HOSPADM

## 2018-10-12 RX ORDER — HYDRALAZINE HYDROCHLORIDE 20 MG/ML
2.5-5 INJECTION INTRAMUSCULAR; INTRAVENOUS EVERY 10 MIN PRN
Status: DISCONTINUED | OUTPATIENT
Start: 2018-10-12 | End: 2018-10-12 | Stop reason: HOSPADM

## 2018-10-12 RX ORDER — AMOXICILLIN 250 MG
1 CAPSULE ORAL 2 TIMES DAILY
Qty: 60 TABLET | Refills: 1 | Status: SHIPPED | OUTPATIENT
Start: 2018-10-12

## 2018-10-12 RX ORDER — LIDOCAINE 40 MG/G
CREAM TOPICAL
Status: DISCONTINUED | OUTPATIENT
Start: 2018-10-12 | End: 2018-10-13 | Stop reason: HOSPADM

## 2018-10-12 RX ORDER — FENTANYL CITRATE 50 UG/ML
25-50 INJECTION, SOLUTION INTRAMUSCULAR; INTRAVENOUS
Status: DISCONTINUED | OUTPATIENT
Start: 2018-10-12 | End: 2018-10-12 | Stop reason: HOSPADM

## 2018-10-12 RX ORDER — PROCHLORPERAZINE MALEATE 5 MG
5 TABLET ORAL EVERY 6 HOURS PRN
Status: DISCONTINUED | OUTPATIENT
Start: 2018-10-12 | End: 2018-10-13 | Stop reason: HOSPADM

## 2018-10-12 RX ORDER — ONDANSETRON 4 MG/1
4 TABLET, ORALLY DISINTEGRATING ORAL EVERY 6 HOURS PRN
Status: DISCONTINUED | OUTPATIENT
Start: 2018-10-12 | End: 2018-10-13 | Stop reason: HOSPADM

## 2018-10-12 RX ORDER — MEPERIDINE HYDROCHLORIDE 25 MG/ML
12.5 INJECTION INTRAMUSCULAR; INTRAVENOUS; SUBCUTANEOUS EVERY 5 MIN PRN
Status: DISCONTINUED | OUTPATIENT
Start: 2018-10-12 | End: 2018-10-12 | Stop reason: HOSPADM

## 2018-10-12 RX ORDER — SODIUM CHLORIDE, SODIUM LACTATE, POTASSIUM CHLORIDE, CALCIUM CHLORIDE 600; 310; 30; 20 MG/100ML; MG/100ML; MG/100ML; MG/100ML
INJECTION, SOLUTION INTRAVENOUS CONTINUOUS PRN
Status: DISCONTINUED | OUTPATIENT
Start: 2018-10-12 | End: 2018-10-12

## 2018-10-12 RX ORDER — OFLOXACIN 3 MG/ML
1 SOLUTION/ DROPS OPHTHALMIC 4 TIMES DAILY
Status: DISCONTINUED | OUTPATIENT
Start: 2018-10-12 | End: 2018-10-13 | Stop reason: HOSPADM

## 2018-10-12 RX ORDER — OXYCODONE HYDROCHLORIDE 5 MG/1
5-10 TABLET ORAL
Status: DISCONTINUED | OUTPATIENT
Start: 2018-10-12 | End: 2018-10-13 | Stop reason: HOSPADM

## 2018-10-12 RX ORDER — NALOXONE HYDROCHLORIDE 0.4 MG/ML
.1-.4 INJECTION, SOLUTION INTRAMUSCULAR; INTRAVENOUS; SUBCUTANEOUS
Status: DISCONTINUED | OUTPATIENT
Start: 2018-10-12 | End: 2018-10-13 | Stop reason: HOSPADM

## 2018-10-12 RX ORDER — MAGNESIUM HYDROXIDE 1200 MG/15ML
LIQUID ORAL PRN
Status: DISCONTINUED | OUTPATIENT
Start: 2018-10-12 | End: 2018-10-12 | Stop reason: HOSPADM

## 2018-10-12 RX ORDER — DOCUSATE SODIUM 100 MG/1
100 CAPSULE, LIQUID FILLED ORAL 2 TIMES DAILY
Status: DISCONTINUED | OUTPATIENT
Start: 2018-10-12 | End: 2018-10-13 | Stop reason: HOSPADM

## 2018-10-12 RX ORDER — FENTANYL CITRATE 50 UG/ML
INJECTION, SOLUTION INTRAMUSCULAR; INTRAVENOUS PRN
Status: DISCONTINUED | OUTPATIENT
Start: 2018-10-12 | End: 2018-10-12

## 2018-10-12 RX ORDER — LIDOCAINE HYDROCHLORIDE 20 MG/ML
INJECTION, SOLUTION INFILTRATION; PERINEURAL PRN
Status: DISCONTINUED | OUTPATIENT
Start: 2018-10-12 | End: 2018-10-12

## 2018-10-12 RX ORDER — VECURONIUM BROMIDE 1 MG/ML
INJECTION, POWDER, LYOPHILIZED, FOR SOLUTION INTRAVENOUS PRN
Status: DISCONTINUED | OUTPATIENT
Start: 2018-10-12 | End: 2018-10-12

## 2018-10-12 RX ORDER — ALBUTEROL SULFATE 0.83 MG/ML
2.5 SOLUTION RESPIRATORY (INHALATION) EVERY 4 HOURS PRN
Status: DISCONTINUED | OUTPATIENT
Start: 2018-10-12 | End: 2018-10-12 | Stop reason: HOSPADM

## 2018-10-12 RX ORDER — NALOXONE HYDROCHLORIDE 0.4 MG/ML
.1-.4 INJECTION, SOLUTION INTRAMUSCULAR; INTRAVENOUS; SUBCUTANEOUS
Status: CANCELLED | OUTPATIENT
Start: 2018-10-12 | End: 2018-10-13

## 2018-10-12 RX ADMIN — POLYETHYLENE GLYCOL 3350 17 G: 17 POWDER, FOR SOLUTION ORAL at 22:42

## 2018-10-12 RX ADMIN — Medication 5 MG: at 11:43

## 2018-10-12 RX ADMIN — VECURONIUM BROMIDE 2 MG: 1 INJECTION, POWDER, LYOPHILIZED, FOR SOLUTION INTRAVENOUS at 14:29

## 2018-10-12 RX ADMIN — VECURONIUM BROMIDE 2 MG: 1 INJECTION, POWDER, LYOPHILIZED, FOR SOLUTION INTRAVENOUS at 13:39

## 2018-10-12 RX ADMIN — CEFAZOLIN SODIUM 2 G: 2 INJECTION, SOLUTION INTRAVENOUS at 11:45

## 2018-10-12 RX ADMIN — ACETAMINOPHEN 975 MG: 325 TABLET, FILM COATED ORAL at 20:14

## 2018-10-12 RX ADMIN — PHENYLEPHRINE HYDROCHLORIDE 100 MCG: 10 INJECTION, SOLUTION INTRAMUSCULAR; INTRAVENOUS; SUBCUTANEOUS at 11:52

## 2018-10-12 RX ADMIN — PHENYLEPHRINE HYDROCHLORIDE 100 MCG: 10 INJECTION, SOLUTION INTRAMUSCULAR; INTRAVENOUS; SUBCUTANEOUS at 14:07

## 2018-10-12 RX ADMIN — SODIUM CHLORIDE, POTASSIUM CHLORIDE, SODIUM LACTATE AND CALCIUM CHLORIDE: 600; 310; 30; 20 INJECTION, SOLUTION INTRAVENOUS at 18:05

## 2018-10-12 RX ADMIN — Medication 5 MG: at 15:25

## 2018-10-12 RX ADMIN — PHENYLEPHRINE HYDROCHLORIDE 100 MCG: 10 INJECTION, SOLUTION INTRAMUSCULAR; INTRAVENOUS; SUBCUTANEOUS at 12:46

## 2018-10-12 RX ADMIN — PHENYLEPHRINE HYDROCHLORIDE 100 MCG: 10 INJECTION, SOLUTION INTRAMUSCULAR; INTRAVENOUS; SUBCUTANEOUS at 13:55

## 2018-10-12 RX ADMIN — VECURONIUM BROMIDE 3 MG: 1 INJECTION, POWDER, LYOPHILIZED, FOR SOLUTION INTRAVENOUS at 13:56

## 2018-10-12 RX ADMIN — GLYCOPYRROLATE 0.8 MG: 0.2 INJECTION, SOLUTION INTRAMUSCULAR; INTRAVENOUS at 15:45

## 2018-10-12 RX ADMIN — SODIUM CHLORIDE 1000 ML: 900 IRRIGANT IRRIGATION at 12:11

## 2018-10-12 RX ADMIN — DEXAMETHASONE SODIUM PHOSPHATE 4 MG: 4 INJECTION, SOLUTION INTRA-ARTICULAR; INTRALESIONAL; INTRAMUSCULAR; INTRAVENOUS; SOFT TISSUE at 11:58

## 2018-10-12 RX ADMIN — SODIUM CHLORIDE, POTASSIUM CHLORIDE, SODIUM LACTATE AND CALCIUM CHLORIDE: 600; 310; 30; 20 INJECTION, SOLUTION INTRAVENOUS at 16:30

## 2018-10-12 RX ADMIN — DEXMEDETOMIDINE HYDROCHLORIDE 0.3 MCG/KG/HR: 100 INJECTION, SOLUTION INTRAVENOUS at 11:58

## 2018-10-12 RX ADMIN — PHENYLEPHRINE HYDROCHLORIDE 100 MCG: 10 INJECTION, SOLUTION INTRAMUSCULAR; INTRAVENOUS; SUBCUTANEOUS at 11:57

## 2018-10-12 RX ADMIN — BUPIVACAINE HYDROCHLORIDE 30 ML: 2.5 INJECTION, SOLUTION EPIDURAL; INFILTRATION; INTRACAUDAL; PERINEURAL at 15:34

## 2018-10-12 RX ADMIN — Medication 0.5 MG: at 16:38

## 2018-10-12 RX ADMIN — VECURONIUM BROMIDE 2 MG: 1 INJECTION, POWDER, LYOPHILIZED, FOR SOLUTION INTRAVENOUS at 14:48

## 2018-10-12 RX ADMIN — SODIUM CHLORIDE, POTASSIUM CHLORIDE, SODIUM LACTATE AND CALCIUM CHLORIDE: 600; 310; 30; 20 INJECTION, SOLUTION INTRAVENOUS at 10:30

## 2018-10-12 RX ADMIN — CEFAZOLIN SODIUM 1 G: 2 INJECTION, SOLUTION INTRAVENOUS at 15:38

## 2018-10-12 RX ADMIN — Medication 5 MG: at 12:07

## 2018-10-12 RX ADMIN — SODIUM CHLORIDE, POTASSIUM CHLORIDE, SODIUM LACTATE AND CALCIUM CHLORIDE: 600; 310; 30; 20 INJECTION, SOLUTION INTRAVENOUS at 13:30

## 2018-10-12 RX ADMIN — Medication 5 MG: at 13:04

## 2018-10-12 RX ADMIN — PHENYLEPHRINE HYDROCHLORIDE 100 MCG: 10 INJECTION, SOLUTION INTRAMUSCULAR; INTRAVENOUS; SUBCUTANEOUS at 13:13

## 2018-10-12 RX ADMIN — HEPARIN SODIUM 5000 UNITS: 5000 INJECTION, SOLUTION INTRAVENOUS; SUBCUTANEOUS at 22:42

## 2018-10-12 RX ADMIN — PHENYLEPHRINE HYDROCHLORIDE 100 MCG: 10 INJECTION, SOLUTION INTRAMUSCULAR; INTRAVENOUS; SUBCUTANEOUS at 13:40

## 2018-10-12 RX ADMIN — PHENYLEPHRINE HYDROCHLORIDE 200 MCG: 10 INJECTION, SOLUTION INTRAMUSCULAR; INTRAVENOUS; SUBCUTANEOUS at 15:38

## 2018-10-12 RX ADMIN — LIDOCAINE HYDROCHLORIDE 100 MG: 20 INJECTION, SOLUTION INFILTRATION; PERINEURAL at 11:37

## 2018-10-12 RX ADMIN — VECURONIUM BROMIDE 2 MG: 1 INJECTION, POWDER, LYOPHILIZED, FOR SOLUTION INTRAVENOUS at 12:04

## 2018-10-12 RX ADMIN — VECURONIUM BROMIDE 2 MG: 1 INJECTION, POWDER, LYOPHILIZED, FOR SOLUTION INTRAVENOUS at 13:25

## 2018-10-12 RX ADMIN — PHENYLEPHRINE HYDROCHLORIDE 100 MCG: 10 INJECTION, SOLUTION INTRAMUSCULAR; INTRAVENOUS; SUBCUTANEOUS at 13:22

## 2018-10-12 RX ADMIN — Medication 5 MG: at 14:04

## 2018-10-12 RX ADMIN — KETOROLAC TROMETHAMINE 1 DROP: 5 SOLUTION OPHTHALMIC at 20:00

## 2018-10-12 RX ADMIN — DOCUSATE SODIUM 100 MG: 100 CAPSULE, LIQUID FILLED ORAL at 22:42

## 2018-10-12 RX ADMIN — PHENYLEPHRINE HYDROCHLORIDE 100 MCG: 10 INJECTION, SOLUTION INTRAMUSCULAR; INTRAVENOUS; SUBCUTANEOUS at 12:01

## 2018-10-12 RX ADMIN — HEPARIN SODIUM 5000 UNITS: 5000 INJECTION, SOLUTION INTRAVENOUS; SUBCUTANEOUS at 12:18

## 2018-10-12 RX ADMIN — PHENYLEPHRINE HYDROCHLORIDE 100 MCG: 10 INJECTION, SOLUTION INTRAMUSCULAR; INTRAVENOUS; SUBCUTANEOUS at 13:02

## 2018-10-12 RX ADMIN — PROPOFOL 150 MG: 10 INJECTION, EMULSION INTRAVENOUS at 11:37

## 2018-10-12 RX ADMIN — PHENYLEPHRINE HYDROCHLORIDE 100 MCG: 10 INJECTION, SOLUTION INTRAMUSCULAR; INTRAVENOUS; SUBCUTANEOUS at 15:25

## 2018-10-12 RX ADMIN — GLYCOPYRROLATE 0.2 MG: 0.2 INJECTION, SOLUTION INTRAMUSCULAR; INTRAVENOUS at 12:22

## 2018-10-12 RX ADMIN — PHENYLEPHRINE HYDROCHLORIDE 0.25 MCG/KG/MIN: 10 INJECTION, SOLUTION INTRAMUSCULAR; INTRAVENOUS; SUBCUTANEOUS at 13:18

## 2018-10-12 RX ADMIN — Medication 5 MG: at 14:37

## 2018-10-12 RX ADMIN — ONDANSETRON 4 MG: 2 INJECTION INTRAMUSCULAR; INTRAVENOUS at 15:39

## 2018-10-12 RX ADMIN — NEOSTIGMINE METHYLSULFATE 4.5 MG: 1 INJECTION, SOLUTION INTRAVENOUS at 15:45

## 2018-10-12 RX ADMIN — PHENYLEPHRINE HYDROCHLORIDE 200 MCG: 10 INJECTION, SOLUTION INTRAMUSCULAR; INTRAVENOUS; SUBCUTANEOUS at 11:42

## 2018-10-12 RX ADMIN — OFLOXACIN 1 DROP: 3 SOLUTION/ DROPS OPHTHALMIC at 20:15

## 2018-10-12 RX ADMIN — ATROPA BELLADONNA AND OPIUM 30 MG: 16.2; 3 SUPPOSITORY RECTAL at 15:34

## 2018-10-12 RX ADMIN — VECURONIUM BROMIDE 2 MG: 1 INJECTION, POWDER, LYOPHILIZED, FOR SOLUTION INTRAVENOUS at 13:00

## 2018-10-12 RX ADMIN — PHENYLEPHRINE HYDROCHLORIDE 100 MCG: 10 INJECTION, SOLUTION INTRAMUSCULAR; INTRAVENOUS; SUBCUTANEOUS at 12:55

## 2018-10-12 RX ADMIN — FENTANYL CITRATE 50 MCG: 50 INJECTION, SOLUTION INTRAMUSCULAR; INTRAVENOUS at 11:37

## 2018-10-12 RX ADMIN — PHENYLEPHRINE HYDROCHLORIDE 200 MCG: 10 INJECTION, SOLUTION INTRAMUSCULAR; INTRAVENOUS; SUBCUTANEOUS at 15:28

## 2018-10-12 RX ADMIN — MIDAZOLAM 1 MG: 1 INJECTION INTRAMUSCULAR; INTRAVENOUS at 11:32

## 2018-10-12 RX ADMIN — VECURONIUM BROMIDE 3 MG: 1 INJECTION, POWDER, LYOPHILIZED, FOR SOLUTION INTRAVENOUS at 11:55

## 2018-10-12 RX ADMIN — VECURONIUM BROMIDE 1 MG: 1 INJECTION, POWDER, LYOPHILIZED, FOR SOLUTION INTRAVENOUS at 15:02

## 2018-10-12 RX ADMIN — ROCURONIUM BROMIDE 50 MG: 10 INJECTION INTRAVENOUS at 11:37

## 2018-10-12 RX ADMIN — PHENYLEPHRINE HYDROCHLORIDE 100 MCG: 10 INJECTION, SOLUTION INTRAMUSCULAR; INTRAVENOUS; SUBCUTANEOUS at 12:09

## 2018-10-12 NOTE — ANESTHESIA PREPROCEDURE EVALUATION
Anesthesia Evaluation     . Pt has had prior anesthetic.     No history of anesthetic complications          ROS/MED HX    ENT/Pulmonary:      (-) sleep apnea   Neurologic:       Cardiovascular:     (+) Dyslipidemia, ----. : . . . :. . Previous cardiac testing date:results:date: results:ECG reviewed date: results:NSR date: results:          METS/Exercise Tolerance:     Hematologic:         Musculoskeletal:         GI/Hepatic:        (-) GERD   Renal/Genitourinary:         Endo:         Psychiatric:         Infectious Disease:         Malignancy:   (+) Malignancy History of Prostate          Other:                     Physical Exam  Normal systems: cardiovascular and pulmonary    Airway   Mallampati: II  TM distance: >3 FB  Neck ROM: full    Dental   (+) chipped    Cardiovascular       Pulmonary    breath sounds clear to auscultation                    Anesthesia Plan      History & Physical Review  History and physical reviewed and following examination; no interval change.    ASA Status:  2 .    NPO Status:  > 8 hours    Plan for General and ETT with Intravenous induction. Maintenance will be Balanced.    PONV prophylaxis:  Ondansetron (or other 5HT-3) and Dexamethasone or Solumedrol       Postoperative Care  Postoperative pain management:  IV analgesics and Oral pain medications.      Consents  Anesthetic plan, risks, benefits and alternatives discussed with:  Patient..                          .

## 2018-10-12 NOTE — PROGRESS NOTES
Admission medication history interview status for the 10/12/2018  admission is complete. See EPIC admission navigator for prior to admission medications     Medication history source reliability:Moderate    Medication history interview source(s):Patient and Family    Medication history resources (including written lists, pill bottles, clinic record):Verified Simvastatin with [facility]    Primary pharmacy.[facility]    Additional medication history information not noted on PTA med list :None    Time spent in this activity: 40 minutes    Prior to Admission medications    Medication Sig Last Dose Taking? Auth Provider   Acetaminophen (TYLENOL PO) Take 1,000 mg by mouth every morning 10/10/2018 at AM Yes Reported, Patient   aspirin 81 MG tablet Take 81 mg by mouth daily  9/21/2018 at AM Yes Reported, Patient   Calcium Carbonate-Vit D-Min (CALCIUM 1200 PO) Take 1 tablet by mouth daily 10/10/2018 at AM Yes Reported, Patient   DiphenhydrAMINE HCl (BENADRYL PO) Take 12.5-25 mg by mouth nightly as needed 10/10/2018 at HS Yes Reported, Patient   Glucosamine-Chondroit-Vit C-Mn (GLUCOSAMINE 1500 COMPLEX PO) Take 2 tablets by mouth daily 9/21/2018 at AM Yes Reported, Patient   Multiple Vitamins-Minerals (CENTRUM CARDIO PO) Take 1 tablet by mouth daily  10/10/2018 at AM Yes Reported, Patient   naproxen sodium (ANAPROX) 220 MG tablet Take 220 mg by mouth At Bedtime  10/6/2018 at HS Yes Reported, Patient   OVER-THE-COUNTER Place 1 drop into both eyes daily as needed (Dry Eyes) ROHTO Dry eye aid    Povidone 0.68% - Lubricant  Propylene glycol 0.3% - Lubricant 10/11/2018 at AM Yes Reported, Patient   polyethylene glycol (MIRALAX/GLYCOLAX) powder Take 17 g by mouth daily as needed  10/11/2018 at PM Yes Reported, Patient   SIMVASTATIN PO Take 10 mg by mouth daily (0.5 x 20 mg = 10 mg dose) 10/11/2018 at AM Yes Reported, Patient   tamsulosin (FLOMAX) 0.4 MG capsule Take 0.4 mg by mouth 2 times daily  10/10/2018 at PM Yes Reported, Patient

## 2018-10-12 NOTE — IP AVS SNAPSHOT
[address]    [place] [address]    Phone:  [phone]                                       After Visit Summary   10/12/2018    Pedro Dumont    MRN: [id number]           After Visit Summary Signature Page     I have received my discharge instructions, and my questions have been answered. I have discussed any challenges I see with this plan with the nurse or doctor.    ..........................................................................................................................................  Patient/Patient Representative Signature      ..........................................................................................................................................  Patient Representative Print Name and Relationship to Patient    ..................................................               ................................................  Date                                   Time    ..........................................................................................................................................  Reviewed by Signature/Title    ...................................................              ..............................................  Date                                               Time          22EPIC Rev 08/18

## 2018-10-12 NOTE — PROVIDER NOTIFICATION
"Pt c/o right eye irritation. Pt states \"feels like there is a boulder in my eye.\" Attempted saline flush, ineffective. Anesthesia notified to assess.    "

## 2018-10-12 NOTE — OP NOTE
OPERATIVE REPORT  DATE OF SURGERY: 10/12/18  LOCATION OF SURGERY: [facility] OR  PREOPERATIVE DIAGNOSIS:  (C61) Prostate cancer (H)  (primary encounter diagnosis)  POSTOPERATIVE DIAGNOSIS: (C61) Prostate cancer (H)  (primary encounter diagnosis)  START TIME: 12:11 PM  END TIME: 3:46 PM  PROCEDURE PERFORMED:   ROBOTIC ASSISTED LAPAROSCOPIC RADICAL PROSTATECTOMY   ROBOTIC ASSISTED BILATERAL PELVIC LYMPH NODE DISSECTION     STAFF SURGEON: Tristin Hughes MD  RESIDENT SURGEON: Nuno Briseno MD  ANESTHESIA: General.   ESTIMATED BLOOD LOSS: 200 mL.   DRAINS AND TUBES: 20fr Shawnee tip guerra with 15cc in the balloon, 19fr Barry drain  COMPLICATIONS: None.   DISPOSITION: PACU.   SPECIMENS OBTAINED:   ID Type Source Tests Collected by Time Destination   A : APICAL MARGIN Tissue Other SURGICAL PATHOLOGY EXAM Tristin Hughes MD 10/12/2018  2:09 PM    B : PROSTATE WITH BILATERAL LYMPH NODES Tissue Prostate SURGICAL PATHOLOGY EXAM Tristin Hughes MD 10/12/2018  3:21 PM       SIGNIFICANT FINDINGS: Prostate and SVs removed en bloc, bilateral pelvic lymph node removed. Water tight anastomosis.      HISTORY OF PRESENT ILLNESS:    68 y/o man with high risk prostate cancer. Khadar 5+4=9 in single core from LEFT apex, rest of cores 3+3=6, PSA 9.1. Metastatic evaluation negative.     OPERATION PERFORMED:   Informed consent was obtained and the patient was brought to the operating room where general anesthesia was induced. The patient was given appropriate preoperative antibiotics and positioned supine. The patient was then repositioned in dorsal lithotomy with all pressure points padded. We then performed a timeout, verifying the correct patient's site and procedure to be performed.    Port placement was performed in the usual fashion includin mm supraumbilical camera port via Veress needle; Two left lateral robotic ports 8mm under direct vision; One right lateral robotic port under direct vision; 12mm right lateral assist port  under direct vision. The robot was then docked. Sigmoid adhesions were taken down from the left pelvic side wall and inguinal area. The bladder was then dissected free from the anterior abdominal wall. The endopelvic fascia was cleared using electrocautery. The superficial dorsal vein was controlled with electrocautery. The endopelvic fascia was then incised sharply on both sides of the prostate to expose the dorsal venous complex. The dorsal vein was then ligated with 0 Monocyrl suture. The bladder neck was then dissected free from the prostate with care not to damage the ureteral orifices. The vas and seminal vesicles were dissected free from their posterior prostate attachments. Dissection was carried further posteriorly, exposing Denonvillier's fascia, which was incised and used to develop a plane to the prostatic apex posteriorly. The prostatic pedicles bilaterally were divided with Weck clips and scissors and right sided nerve sparing was performed. Nerve sparing was not attempted on the left given the high grade disease on that side. The dorsal vein complex was then divided with electrocautery and urethra transected sharply to completely free the prostate.       The lymph nodes were dissected in the usual fashion with electrocautery bilaterally. The boundaries of the dissection included the bifurcation of the common iliac vein to the external iliac vein posteriorly, pelvic side wall laterally and anteriorly, and the obturator nerve inferiorly. The specimen was placed in an endocatch bag.       The anastomosis was then created between the bladder neck and the urethra using 3-0 V-lock suture. This was completed in a running fashion. A fresh 20Fr Reilly catheter was then inserted, the balloon was inflated with 15 cc water, and the bladder was filled with normal saline with 180cc. The vesicourethral anastomosis was noted to be watertight.      A 19fr Barry drain was placed in the left lateral pelvic gutter and  brought out through the lateral port site. The assistant port was closed with a Syd-Shipley device. The remaining ports were removed under direct vision with no bleeding noted from the abdominal wall, and the abdomen was desufflated. The midline camera port site was extended and the specimen was extracted. The fascia was then closed with 0 PDS. All port sites were irrigated with normal saline. 30 cc marcaine was injected for incisional analgesia. The skin was closed with 4-0 monocyl in a running subcuticular fashion and skin glue was applied.      At the completion of the procedure, all surgical counts were correct.       The patient tolerated the procedure well, and he was awakened from anesthesia, extubated and transferred to the PACU in stable condition.     Tristin Hughes MD   Urology  [facility]  Clinic Phone [phone]

## 2018-10-12 NOTE — ANESTHESIA POSTPROCEDURE EVALUATION
Patient: Pedro Dumont    Procedure(s):  ROBOTIC  RADICAL PROSTATECTOMY,BILATERAL  LYMPH NODE DISSECTION  - Wound Class: II-Clean Contaminated    Diagnosis:prostate cancer   Diagnosis Additional Information: No value filed.    Anesthesia Type:  General, ETT    Note:  Anesthesia Post Evaluation    Patient location during evaluation: PACU  Patient participation: Able to fully participate in evaluation  Level of consciousness: awake  Pain management: adequate  Airway patency: patent  Cardiovascular status: acceptable  Respiratory status: acceptable  Hydration status: acceptable  PONV: none     Anesthetic complications: None          Last vitals:  Vitals:    10/12/18 1717 10/12/18 1754 10/12/18 1803   BP:  94/51 103/58   Pulse:      Resp:  18 12   Temp:  36.4  C (97.6  F)    SpO2: 100% 94% 97%         Electronically Signed By: Kelin Hampton MD, MD  October 12, 2018  6:20 PM

## 2018-10-12 NOTE — ANESTHESIA CARE TRANSFER NOTE
Patient: Pedro Dumont    Procedure(s):  ROBOTIC  RADICAL PROSTATECTOMY,BILATERAL  LYMPH NODE DISSECTION  - Wound Class: II-Clean Contaminated    Diagnosis: prostate cancer   Diagnosis Additional Information: No value filed.    Anesthesia Type:   General, ETT     Note:  Airway :Face Mask  Patient transferred to:PACU  Comments: To pacu. Vss. Report given to RN assuming care of ptHandoff Report: Identifed the Patient, Identified the Reponsible Provider, Reviewed the pertinent medical history, Discussed the surgical course, Reviewed Intra-OP anesthesia mangement and issues during anesthesia, Set expectations for post-procedure period and Allowed opportunity for questions and acknowledgement of understanding      Vitals: (Last set prior to Anesthesia Care Transfer)    CRNA VITALS  10/12/2018 1523 - 10/12/2018 1559      10/12/2018             Pulse: 89    SpO2: 97 %    Resp Rate (set): 10                Electronically Signed By: CLEMENTINA Villafuerte CRNA  October 12, 2018  3:59 PM

## 2018-10-12 NOTE — PROGRESS NOTES
"Anesthesia Follow-up Note    Called by patient's nurse of new complaint of \"eye pain\".  Patient reports \"boulder under my eyelid\". Nurse noted attempting to flush eye with saline without improvement.    Exam:  - right eye visualized with light source, no obvious abrasion, no corneal or conjunctival injection noted    Impression/Plan:  - possible corneal abrasion  - ordered ciprofloxacin drops x48 hours  - ordered ketorolac drops x48 hours  - patient instructed not to rub eye and to notify nursing/anesthesia if eye discomfort starts to worsen despite drops or if there are changes in his vision    Kendrick Longo MD  Anesthesiology  "

## 2018-10-12 NOTE — IP AVS SNAPSHOT
MRN:[id number]                      After Visit Summary   10/12/2018    Pedro Dumont    MRN: [id number]           Thank you!     Thank you for choosing [facility] for your care. Our goal is always to provide you with excellent care. Hearing back from our patients is one way we can continue to improve our services. Please take a few minutes to complete the written survey that you may receive in the mail after you visit with us. Thank you!        Patient Information     Date Of Birth          1950        Designated Caregiver       Most Recent Value    Caregiver    Will someone help with your care after discharge? yes    Name of designated caregiver Oscar-wife    Phone number of caregiver [phone]    Caregiver address [place] , [place]      About your hospital stay     You were admitted on:  October 12, 2018 You last received care in the:  [place] [facility] [address] [facility]    You were discharged on:  October 13, 2018       Who to Call     For medical emergencies, please call 911.  For non-urgent questions about your medical care, please call your primary care provider or clinic, [phone]  For questions related to your surgery, please call your surgery clinic        Attending Provider     Provider Specialty    Tristin Hughes MD Urology       Primary Care Provider Office Phone # Fax Jordan Casarez [phone]      After Care Instructions     Diet Instructions       Resume pre-procedure diet            Discharge Instructions       Discharge Diet:   -Regular    Activity:   - No strenuous exercise for 6 weeks.   - No lifting, pushing, pulling more than 10 pounds for 6 weeks. Take care when pushing with your arms to stand up.  - Do not strain your belly area.  When you bend, sit up or twice, you could strain the area around your incision.    - Do not strain with bowel movements.    - Do not drive until you can press the brake pedal quickly and fully without pain.   - Do not operate  "a motor vehicle while taking narcotic pain medications.     Medications:   1) PAIN: Oxycodone is a narcotic medication that has been prescribed for pain.  Narcotics will cause sleepiness and constipation, therefore it is best to stop or reduce them as soon as you can and switch to using acetaminophen (Tylenol) and/or ibuprofen (Advil/Motrin), taken as directed on the packaging.  Keep in mind that certain narcotics can contain acetaminophen, also called \"APAP\" on prescription bottles.  Do not take more than 4,000mg of Tylenol (acetaminophen/ APAP) from all sources in any 24 hour period since this can cause liver damage.  Never drive, operate machinery or drink alcoholic beverages while you are taking narcotic pain medications.     2) CONSTIPATION: Pericolace (senna/docusate sodium) can be taken twice daily for prevention of constipation since surgery, pain medications and bladder spasm medications can all make you constipated.  Please reduce or stop pericolace if you develop loose stools. Other over the counter solutions such as prune juice, miralax, fiber products, senna, and dulcolax can also be used. If you are taking the pericolace but still have not had a bowel movement in 3 days, start over-the-counter Milk of Magnesia taken twice daily until you have a nice bowel movement.  Call the office with any concerns.     3) ANTIBIOTICS  - Ciprofloxacin 500mg #1 tablet should be taken one hour prior to your followup appointment with Dr. Hughes to remove your Reilly catheter    Wound Care:   - You may shower and get incisions wet starting 48 hrs after surgery.  - Do not scrub incisions or submerge wounds (aka, bath, pool, hot tub, etc.) for 2 weeks or until wounds have healed and catheter is removed.  - Remove wound dressing 48 hours after surgery if already not removed.   - If purple dermabond glue was used, avoid applying any lotions or ointments.   - Leave incision open to air.  Cover with gauze only if needed for " comfort or to protect clothing from drainage.     Drains:  1) Reilly Catheter: You are going home with a Reilly catheter which will remain in place until your follow-up appointment. Your nurse or  will provide written catheter care instructions for you to take home.  - Protect the catheter and treat it like an extension of your body - keep it secured to your leg and do not let it get caught, snagged, or tugged.  - Should the catheter somehow come out of position, do not let anyone but a urologist who is aware of your recent surgery try to replace a catheter.  Best yet, contact our urology office right away with any concerns.   - Apply bacitracin twice daily to the tip of the penis/catheter insertion point to keep the area lubricated and more comfortable.      Follow-Up:   - Call your primary care provider to touch base regarding your recent procedure and admission.    - Follow up with Dr. Hughes as scheduled for catheter removal and review of pathology  - Call or return sooner than your regularly scheduled visit if you develop any of the following:  Fever (greater than 101.3F), uncontrolled pain, uncontrolled nausea or vomiting, as well as increased redness, swelling, or drainage from your wound.    Phone numbers:  - Monday through Friday 8am to 4:30pm: call [phone].    - Nights or weekends, call [phone] and ask the  to page [facility] on call.   - For emergencies, always call [phone]            No Alcohol       For 24 hours post procedure            No driving or operating machinery        until the day after procedure            Weight bearing status - As tolerated                 Your next 10 appointments already scheduled     Oct 24, 2018 10:30 AM CDT   Return Visit with Tristin Hughes MD   [facility] ([facility] [place])    [address]  [address]  [facility] [address]   [phone]              Pending Results     Date and Time  "Order Name Status Description    10/12/2018 1410 Surgical pathology exam In process             Statement of Approval     Ordered          10/13/18 2219  I have reviewed and agree with all the recommendations and orders detailed in this document.  EFFECTIVE NOW     Approved and electronically signed by:  Pedro Villalba MD             Admission Information     Date & Time Provider Department Dept. Phone    10/12/2018 Tristin Hughes MD [place] [address] [facility] [phone]      Your Vitals Were     Blood Pressure Pulse Temperature Respirations Height Weight    118/70 (BP Location: Right arm) 61 98.4  F (36.9  C) (Oral) 16 1.676 m (5' 6\") 85.7 kg (189 lb)    Pulse Oximetry BMI (Body Mass Index)                97% 30.51 kg/m2          MyChart Information     [email or web address] lets you send messages to your doctor, view your test results, renew your prescriptions, schedule appointments and more. To sign up, go to [email or web address] . Click on \"Log in\" on the left side of the screen, which will take you to the Welcome page. Then click on \"Sign up Now\" on the right side of the page.     You will be asked to enter the access code listed below, as well as some personal information. Please follow the directions to create your username and password.     Your access code is: [email or web address]  Expires: 2018  8:52 PM     Your access code will  in 90 days. If you need help or a new code, please call your [facility] clinic or [phone].        Care EveryWhere ID     This is your Care EveryWhere ID. This could be used by other organizations to access your [facility] medical records  [id number]        Equal Access to Services     [facility] HospitalFAIZAN AH: Hadii aad alexsander hadanthonyo Soana paulaali, waaxda luqadaha, qaybta kaalmada adekayleeyada, yamini ha . So [place] [phone].    ATENCIÓN: Si habla español, tiene a deluna disposición servicios gratuitos de asistencia lingüística. Llame al [phone].    We " comply with applicable federal civil rights laws and [place] laws. We do not discriminate on the basis of race, color, national origin, age, disability, sex, sexual orientation, or gender identity.               Review of your medicines      UNREVIEWED medicines. Ask your doctor about these medicines        Dose / Directions    ciprofloxacin 500 MG tablet   Commonly known as:  CIPRO   Used for:  Prostate cancer (H)   Ask about: Should I take this medication?        Dose:  500 mg   Take 1 tablet (500 mg) by mouth once for 1 dose For catheter removal   Quantity:  1 tablet   Refills:  0         START taking        Dose / Directions    enoxaparin 40 MG/0.4ML injection   Commonly known as:  LOVENOX   Used for:  Prostate cancer (H)        Dose:  40 mg   Inject 0.4 mLs (40 mg) Subcutaneous daily for 28 days   Quantity:  11.2 mL   Refills:  0       oxybutynin 5 MG tablet   Commonly known as:  DITROPAN   Used for:  Prostate cancer (H)        Dose:  5 mg   Take 1 tablet (5 mg) by mouth 3 times daily as needed for bladder spasms   Quantity:  30 tablet   Refills:  1       oxyCODONE IR 5 MG tablet   Commonly known as:  ROXICODONE   Used for:  Prostate cancer (H)        Dose:  5 mg   Take 1 tablet (5 mg) by mouth every 6 hours as needed for pain   Quantity:  20 tablet   Refills:  0       senna-docusate 8.6-50 MG per tablet   Commonly known as:  SENOKOT-S;PERICOLACE   Used for:  Prostate cancer (H)        Dose:  1 tablet   Take 1 tablet by mouth 2 times daily To be taken with opioid (narcotic) pain medication to prevent constipation.   Quantity:  60 tablet   Refills:  1         CONTINUE these medicines which have NOT CHANGED        Dose / Directions    aspirin 81 MG tablet        Dose:  81 mg   Take 81 mg by mouth daily   Refills:  0       BENADRYL PO        Dose:  12.5-25 mg   Take 12.5-25 mg by mouth nightly as needed   Refills:  0       CALCIUM 1200 PO        Dose:  1 tablet   Take 1 tablet by mouth daily   Refills:  0        CENTRUM CARDIO PO        Dose:  1 tablet   Take 1 tablet by mouth daily   Refills:  0       GLUCOSAMINE 1500 COMPLEX PO        Dose:  2 tablet   Take 2 tablets by mouth daily   Refills:  0       naproxen sodium 220 MG tablet   Commonly known as:  ANAPROX        Dose:  220 mg   Take 220 mg by mouth At Bedtime   Refills:  0       OVER-THE-COUNTER        Dose:  1 drop   Place 1 drop into both eyes daily as needed (Dry Eyes) ROHTO Dry eye aid   Povidone 0.68% - Lubricant Propylene glycol 0.3% - Lubricant   Refills:  0       polyethylene glycol powder   Commonly known as:  MIRALAX/GLYCOLAX        Dose:  17 g   Take 17 g by mouth daily as needed   Refills:  0       SIMVASTATIN PO        Dose:  10 mg   Take 10 mg by mouth daily (0.5 x 20 mg = 10 mg dose)   Refills:  0       TYLENOL PO        Dose:  1000 mg   Take 1,000 mg by mouth every morning   Refills:  0         STOP taking     tamsulosin 0.4 MG capsule   Commonly known as:  FLOMAX                Where to get your medicines      These medications were sent to [facility] - [place] - [address]  [address] [facility] [[email or web address] [address], [facility] [address]     Phone:  [phone]     oxybutynin 5 MG tablet         Some of these will need a paper prescription and others can be bought over the counter. Ask your nurse if you have questions.     Bring a paper prescription for each of these medications     ciprofloxacin 500 MG tablet    enoxaparin 40 MG/0.4ML injection    oxyCODONE IR 5 MG tablet    senna-docusate 8.6-50 MG per tablet                Protect others around you: Learn how to safely use, store and throw away your medicines at www.disposemymeds.org.        Information about OPIOIDS     PRESCRIPTION OPIOIDS: WHAT YOU NEED TO KNOW   We gave you an opioid (narcotic) pain medicine. It is important to manage your pain, but opioids are not always the best choice. You should first try all the other options your care team gave you. Take this medicine for as short  a time (and as few doses) as possible.    Some activities can increase your pain, such as bandage changes or therapy sessions. It may help to take your pain medicine 30 to 60 minutes before these activities. Reduce your stress by getting enough sleep, working on hobbies you enjoy and practicing relaxation or meditation. Talk to your care team about ways to manage your pain beyond prescription opioids.    These medicines have risks:    DO NOT drive when on new or higher doses of pain medicine. These medicines can affect your alertness and reaction times, and you could be arrested for driving under the influence (DUI). If you need to use opioids long-term, talk to your care team about driving.    DO NOT operate heavy machinery    DO NOT do any other dangerous activities while taking these medicines.    DO NOT drink any alcohol while taking these medicines.     If the opioid prescribed includes acetaminophen, DO NOT take with any other medicines that contain acetaminophen. Read all labels carefully. Look for the word  acetaminophen  or  Tylenol.  Ask your pharmacist if you have questions or are unsure.    You can get addicted to pain medicines, especially if you have a history of addiction (chemical, alcohol or substance dependence). Talk to your care team about ways to reduce this risk.    All opioids tend to cause constipation. Drink plenty of water and eat foods that have a lot of fiber, such as fruits, vegetables, prune juice, apple juice and high-fiber cereal. Take a laxative (Miralax, milk of magnesia, Colace, Senna) if you don t move your bowels at least every other day. Other side effects include upset stomach, sleepiness, dizziness, throwing up, tolerance (needing more of the medicine to have the same effect), physical dependence and slowed breathing.    Store your pills in a secure place, locked if possible. We will not replace any lost or stolen medicine. If you don t finish your medicine, please throw away  (dispose) as directed by your pharmacist. The [place] Pollution Control Agency has more information about safe disposal: https://www.pca.[facility].mn.us/living-green/managing-unwanted-medications             Medication List: This is a list of all your medications and when to take them. Check marks below indicate your daily home schedule. Keep this list as a reference.      Medications           Morning Afternoon Evening Bedtime As Needed    aspirin 81 MG tablet   Take 81 mg by mouth daily                                BENADRYL PO   Take 12.5-25 mg by mouth nightly as needed                                CALCIUM 1200 PO   Take 1 tablet by mouth daily                                CENTRUM CARDIO PO   Take 1 tablet by mouth daily                                enoxaparin 40 MG/0.4ML injection   Commonly known as:  LOVENOX   Inject 0.4 mLs (40 mg) Subcutaneous daily for 28 days                                GLUCOSAMINE 1500 COMPLEX PO   Take 2 tablets by mouth daily                                naproxen sodium 220 MG tablet   Commonly known as:  ANAPROX   Take 220 mg by mouth At Bedtime                                OVER-THE-COUNTER   Place 1 drop into both eyes daily as needed (Dry Eyes) ROHTO Dry eye aid   Povidone 0.68% - Lubricant Propylene glycol 0.3% - Lubricant                                oxybutynin 5 MG tablet   Commonly known as:  DITROPAN   Take 1 tablet (5 mg) by mouth 3 times daily as needed for bladder spasms                                oxyCODONE IR 5 MG tablet   Commonly known as:  ROXICODONE   Take 1 tablet (5 mg) by mouth every 6 hours as needed for pain                                polyethylene glycol powder   Commonly known as:  MIRALAX/GLYCOLAX   Take 17 g by mouth daily as needed                                senna-docusate 8.6-50 MG per tablet   Commonly known as:  SENOKOT-S;PERICOLACE   Take 1 tablet by mouth 2 times daily To be taken with opioid (narcotic) pain medication to prevent  constipation.                                SIMVASTATIN PO   Take 10 mg by mouth daily (0.5 x 20 mg = 10 mg dose)   Last time this was given:  10 mg on 10/13/2018  8:43 AM                                TYLENOL PO   Take 1,000 mg by mouth every morning   Last time this was given:  975 mg on 10/13/2018 10:10 AM                                  ASK your doctor about these medications           Morning Afternoon Evening Bedtime As Needed    ciprofloxacin 500 MG tablet   Commonly known as:  CIPRO   Take 1 tablet (500 mg) by mouth once for 1 dose For catheter removal   Ask about: Should I take this medication?

## 2018-10-12 NOTE — BRIEF OP NOTE
[facility] Brief Operative Note    Pre-operative diagnosis: prostate cancer    Post-operative diagnosis Same   Procedure: Procedure(s):  ROBOTIC  RADICAL PROSTATECTOMY,BILATERAL  LYMPH NODE DISSECTION  - Wound Class: II-Clean Contaminated   Surgeon(s): Surgeon(s) and Role:     * Tristin Hughes MD - Primary     * Nuno Briseno MD - Assistant    Estimated blood loss: 200 mL    Specimens:   ID Type Source Tests Collected by Time Destination   A : APICAL MARGIN Tissue Other SURGICAL PATHOLOGY EXAM Tristin Hughes MD 10/12/2018  2:09 PM    B : PROSTATE WITH BILATERAL LYMPH NODES Tissue Prostate SURGICAL PATHOLOGY EXAM Tristin Hughes MD 10/12/2018  3:21 PM       Findings: Apical margin negative; watertight anastomosis.

## 2018-10-13 VITALS
TEMPERATURE: 98.4 F | HEART RATE: 61 BPM | DIASTOLIC BLOOD PRESSURE: 70 MMHG | SYSTOLIC BLOOD PRESSURE: 118 MMHG | RESPIRATION RATE: 16 BRPM | WEIGHT: 189 LBS | BODY MASS INDEX: 30.37 KG/M2 | OXYGEN SATURATION: 97 % | HEIGHT: 66 IN

## 2018-10-13 LAB
ANION GAP SERPL CALCULATED.3IONS-SCNC: 3 MMOL/L (ref 3–14)
BUN SERPL-MCNC: 12 MG/DL (ref 7–30)
CALCIUM SERPL-MCNC: 8 MG/DL (ref 8.5–10.1)
CHLORIDE SERPL-SCNC: 111 MMOL/L (ref 94–109)
CO2 SERPL-SCNC: 30 MMOL/L (ref 20–32)
CREAT FLD-MCNC: 1 MG/DL
CREAT SERPL-MCNC: 0.92 MG/DL (ref 0.66–1.25)
ERYTHROCYTE [DISTWIDTH] IN BLOOD BY AUTOMATED COUNT: 13.4 % (ref 10–15)
GFR SERPL CREATININE-BSD FRML MDRD: 81 ML/MIN/1.7M2
GLUCOSE BLDC GLUCOMTR-MCNC: 100 MG/DL (ref 70–99)
GLUCOSE SERPL-MCNC: 98 MG/DL (ref 70–99)
HCT VFR BLD AUTO: 35.4 % (ref 40–53)
HGB BLD-MCNC: 11.9 G/DL (ref 13.3–17.7)
MCH RBC QN AUTO: 30.8 PG (ref 26.5–33)
MCHC RBC AUTO-ENTMCNC: 33.6 G/DL (ref 31.5–36.5)
MCV RBC AUTO: 92 FL (ref 78–100)
PLATELET # BLD AUTO: 209 10E9/L (ref 150–450)
POTASSIUM SERPL-SCNC: 4.1 MMOL/L (ref 3.4–5.3)
RBC # BLD AUTO: 3.86 10E12/L (ref 4.4–5.9)
SODIUM SERPL-SCNC: 144 MMOL/L (ref 133–144)
SPECIMEN SOURCE FLD: NORMAL
WBC # BLD AUTO: 12.6 10E9/L (ref 4–11)

## 2018-10-13 PROCEDURE — 36415 COLL VENOUS BLD VENIPUNCTURE: CPT | Performed by: STUDENT IN AN ORGANIZED HEALTH CARE EDUCATION/TRAINING PROGRAM

## 2018-10-13 PROCEDURE — 80048 BASIC METABOLIC PNL TOTAL CA: CPT | Performed by: STUDENT IN AN ORGANIZED HEALTH CARE EDUCATION/TRAINING PROGRAM

## 2018-10-13 PROCEDURE — 82570 ASSAY OF URINE CREATININE: CPT | Performed by: STUDENT IN AN ORGANIZED HEALTH CARE EDUCATION/TRAINING PROGRAM

## 2018-10-13 PROCEDURE — 82962 GLUCOSE BLOOD TEST: CPT

## 2018-10-13 PROCEDURE — A9270 NON-COVERED ITEM OR SERVICE: HCPCS | Mod: GY | Performed by: STUDENT IN AN ORGANIZED HEALTH CARE EDUCATION/TRAINING PROGRAM

## 2018-10-13 PROCEDURE — 25000125 ZZHC RX 250: Performed by: ANESTHESIOLOGY

## 2018-10-13 PROCEDURE — 25000132 ZZH RX MED GY IP 250 OP 250 PS 637: Mod: GY | Performed by: STUDENT IN AN ORGANIZED HEALTH CARE EDUCATION/TRAINING PROGRAM

## 2018-10-13 PROCEDURE — 85027 COMPLETE CBC AUTOMATED: CPT | Performed by: STUDENT IN AN ORGANIZED HEALTH CARE EDUCATION/TRAINING PROGRAM

## 2018-10-13 PROCEDURE — 25000128 H RX IP 250 OP 636: Performed by: STUDENT IN AN ORGANIZED HEALTH CARE EDUCATION/TRAINING PROGRAM

## 2018-10-13 RX ORDER — SIMVASTATIN 10 MG
10 TABLET ORAL DAILY
Status: DISCONTINUED | OUTPATIENT
Start: 2018-10-13 | End: 2018-10-13 | Stop reason: HOSPADM

## 2018-10-13 RX ORDER — OXYBUTYNIN CHLORIDE 5 MG/1
5 TABLET ORAL 3 TIMES DAILY PRN
Qty: 30 TABLET | Refills: 1 | Status: SHIPPED | OUTPATIENT
Start: 2018-10-13 | End: 2018-10-23

## 2018-10-13 RX ORDER — OXYBUTYNIN CHLORIDE 5 MG/1
5 TABLET ORAL 3 TIMES DAILY PRN
Status: DISCONTINUED | OUTPATIENT
Start: 2018-10-13 | End: 2018-10-13 | Stop reason: HOSPADM

## 2018-10-13 RX ADMIN — OFLOXACIN 1 DROP: 3 SOLUTION/ DROPS OPHTHALMIC at 13:41

## 2018-10-13 RX ADMIN — ACETAMINOPHEN 975 MG: 325 TABLET, FILM COATED ORAL at 10:10

## 2018-10-13 RX ADMIN — HEPARIN SODIUM 5000 UNITS: 5000 INJECTION, SOLUTION INTRAVENOUS; SUBCUTANEOUS at 10:10

## 2018-10-13 RX ADMIN — ACETAMINOPHEN 975 MG: 325 TABLET, FILM COATED ORAL at 01:48

## 2018-10-13 RX ADMIN — OFLOXACIN 1 DROP: 3 SOLUTION/ DROPS OPHTHALMIC at 01:23

## 2018-10-13 RX ADMIN — DOCUSATE SODIUM 100 MG: 100 CAPSULE, LIQUID FILLED ORAL at 08:43

## 2018-10-13 RX ADMIN — OFLOXACIN 1 DROP: 3 SOLUTION/ DROPS OPHTHALMIC at 08:44

## 2018-10-13 RX ADMIN — SIMVASTATIN 10 MG: 10 TABLET, FILM COATED ORAL at 08:43

## 2018-10-13 RX ADMIN — KETOROLAC TROMETHAMINE 1 DROP: 5 SOLUTION OPHTHALMIC at 08:43

## 2018-10-13 RX ADMIN — SODIUM CHLORIDE, POTASSIUM CHLORIDE, SODIUM LACTATE AND CALCIUM CHLORIDE: 600; 310; 30; 20 INJECTION, SOLUTION INTRAVENOUS at 01:48

## 2018-10-13 NOTE — PLAN OF CARE
Problem: Patient Care Overview  Goal: Plan of Care/Patient Progress Review  Outcome: Adequate for Discharge Date Met: 10/13/18  Patient discharged from unit today 1530.  Vitals stable.  Capno IPI 10.  Per Dr Villalba verbal orders, removed LORENE drain.  Lap sides liquid bandage intact.  PIV's removed.  Taking in good PO intake with guerra output 1000cc throughout day.  Bowel sounds active and passing gas, advanced to low fiber diet and tolerating well.  Guerra teaching completed with verbal instructions, video and handout provided to patient and spouse.  Guerra supplies sent with patient.  Pain adequately managed with scheduled Tylenol.  Declined Oxycodone at discharge.  All discharge medications sent with patient, Lovenox teaching completed in addition.  He denied any further questions or concerns.

## 2018-10-13 NOTE — PROGRESS NOTES
"/70 (BP Location: Right arm)  Pulse 61  Temp 98.4  F (36.9  C) (Oral)  Resp 16  Ht 1.676 m (5' 6\")  Wt 85.7 kg (189 lb)  SpO2 97%  BMI 30.51 kg/m2    He is making excellent progress the day after robotic radical prostatectomy.  Very little drainage and so the drain will be removed today.  Vital signs stable.  Tolerating regular diet.  Using oral pain medication only and at the present time only Tylenol.  Catheter draining clear urine.  He will go home this afternoon with a catheter in place for follow-up in the office by Dr. Rollins  "

## 2018-10-13 NOTE — PLAN OF CARE
Problem: Patient Care Overview  Goal: Plan of Care/Patient Progress Review  Outcome: No Change      POD 0 DaVinci Prostatectomy. Arrived to unit around 1800. A&Ox4, R eye irritation and blurred vision. Anesthesia notified, eye drops ordered. Denies pain. BP soft. LS diminished. LORENE bloody/red output, dressing changed around site, CDI.  5 port sites, liquid bandage TIARA). Hypoactive bowel sounds, not passing flatus, guerra tea-colored urine. L hand PIV infusing LR. Up to chair tolerated well. Clear liquid diet, tolerating well, advanced to full. Up with Ax1. Plan to discharge tomorrow. Urology primary. Will continue to monitor.

## 2018-10-13 NOTE — PLAN OF CARE
Problem: Patient Care Overview  Goal: Plan of Care/Patient Progress Review   Pt is POD1 Prostatectomy. A&Ox4. VSS on 1L O2. C/o minimal side pain, scheduled Tylenol effective. Lung sounds diminished. A1. Hypoactive bowel sounds, not passing flatus. Reilly patent with light yellow urine. LORENE dark red output. 5 port sites closed with liquid bandage, open to air. Full liquid diet. L PIV infusing LR. Rested well overnight.

## 2018-10-16 LAB — COPATH REPORT: NORMAL

## 2018-10-18 ENCOUNTER — TELEPHONE (OUTPATIENT)
Dept: UROLOGY | Facility: CLINIC | Age: 68
End: 2018-10-18

## 2018-10-18 NOTE — TELEPHONE ENCOUNTER
Pedro ESPANA Tim  [id number]  October 18, 2018  9:37 AM    I called the patient to discuss his pathology results. He is doing well following surgery with no issues. Managing the catheter well. We discussed that his pathology showed negative margins, negative lymph nodes and overall [place] 3+4=7 with tertiary pattern 5. We discussed that this is all very good news.     Plan for follow up next week for guerra removal.    Tristin Hughes M.D.     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

## 2018-10-24 ENCOUNTER — OFFICE VISIT (OUTPATIENT)
Dept: UROLOGY | Facility: CLINIC | Age: 68
End: 2018-10-24
Payer: MEDICARE

## 2018-10-24 VITALS
SYSTOLIC BLOOD PRESSURE: 132 MMHG | WEIGHT: 189 LBS | HEART RATE: 66 BPM | HEIGHT: 66 IN | BODY MASS INDEX: 30.37 KG/M2 | DIASTOLIC BLOOD PRESSURE: 70 MMHG

## 2018-10-24 DIAGNOSIS — C61 PROSTATE CANCER (H): Primary | ICD-10-CM

## 2018-10-24 PROCEDURE — 99024 POSTOP FOLLOW-UP VISIT: CPT | Performed by: UROLOGY

## 2018-10-24 ASSESSMENT — PAIN SCALES - GENERAL: PAINLEVEL: NO PAIN (0)

## 2018-10-24 NOTE — PROGRESS NOTES
"[facility]    CHIEF COMPLAINT   It was my pleasure to see Pedro Dumont who is a 67 year old male for follow-up of prostate cancer.      HPI   Pedro Dumont is a very pleasant 67 year old male who presents with a history of prostate cancer s/p RALP, BPLND on 10/12/18. Pathology resulted with [place] 3+4=7 with tertiary pattern 5, margins negative, lymph nodes negative. He returns for guerra removal today. He is doing well post operatively. He did not require any pain medication. He is ambulating well, using Lovenox and having no issues.     PHYSICAL EXAM  Patient is a 67 year old  male   Vitals: Blood pressure 132/70, pulse 66, height 1.676 m (5' 6\"), weight 85.7 kg (189 lb).  General Appearance Adult: Body mass index is 30.51 kg/(m^2).  Alert, no acute distress, oriented  HENT: throat/mouth:normal, good dentition  Lungs: no respiratory distress, or pursed lip breathing  Heart: No obvious jugular venous distension present  Abdomen: soft, nontender, no organomegaly or masses. Incisions clean, dry and intact with some skin glue still in place  Musculoskeltal: extremities normal, no peripheral edema  Skin: no suspicious lesions or rashes  Neuro: Alert, oriented, speech and mentation normal  Psych: affect and mood normal  Gait: Normal    PATHOLOGY:  FINAL DIAGNOSIS:   <<<<<  A: Prostate, apical margin, excision   - Negative for malignancy     B: Prostate, radical prostatectomy   - Adenocarcinoma, Khadar's grade 3/4 with tertiary grade 5, resection   margins free of malignancy, benign   seminal vesicles, benign lymph nodes (2)      ASSESSMENT and PLAN  67 year old man with history of Khadar 3+4=7 with tertiary pattern 5 prostate cancer s/p RALP, BPLND on 10/12. Margins and lymph nodes negative.    - F/U on 12/27 in [place] with PSA prior  - Advised that all patients will leak some urine initially following guerra removal and that this will gradually improve with time      I spent over 25 minutes with the patient.  Over half " this time was spent on counseling regarding the above.    Tristin Hughes   Urology  [facility]  Clinic Phone [phone]

## 2018-10-24 NOTE — LETTER
"10/24/2018       RE: Pedro Dumont  [address] [address] [place], [place] [address]     Dear Colleague,    Thank you for referring your patient, Pedro Dumont, to the [facility] UROLOGY CLINIC [place] at [facility]. Please see a copy of my visit note below.    [place]    CHIEF COMPLAINT   It was my pleasure to see Pedro Dumont who is a 67 year old male for follow-up of prostate cancer.      HPI   Pedro Dumont is a very pleasant 67 year old male who presents with a history of prostate cancer s/p RALP, BPLND on 10/12/18. Pathology resulted with Val[place] 3+4=7 with tertiary pattern 5, margins negative, lymph nodes negative. He returns for guerra removal today. He is doing well post operatively. He did not require any pain medication. He is ambulating well, using Lovenox and having no issues.     PHYSICAL EXAM  Patient is a 67 year old  male   Vitals: Blood pressure 132/70, pulse 66, height 1.676 m (5' 6\"), weight 85.7 kg (189 lb).  General Appearance Adult: Body mass index is 30.51 kg/(m^2).  Alert, no acute distress, oriented  HENT: throat/mouth:normal, good dentition  Lungs: no respiratory distress, or pursed lip breathing  Heart: No obvious jugular venous distension present  Abdomen: soft, nontender, no organomegaly or masses. Incisions clean, dry and intact with some skin glue still in place  Musculoskeltal: extremities normal, no peripheral edema  Skin: no suspicious lesions or rashes  Neuro: Alert, oriented, speech and mentation normal  Psych: affect and mood normal  Gait: Normal    PATHOLOGY:  FINAL DIAGNOSIS:   <<<<<  A: Prostate, apical margin, excision   - Negative for malignancy     B: Prostate, radical prostatectomy   - Adenocarcinoma, Valdez's grade 3/4 with tertiary grade 5, resection   margins free of malignancy, benign   seminal vesicles, benign lymph nodes (2)      ASSESSMENT and PLAN  67 year old man with history of Khadar 3+4=7 with tertiary pattern " 5 prostate cancer s/p RALP, BPLND on 10/12. Margins and lymph nodes negative.    - F/U on 12/27 in [place] with PSA prior  - Advised that all patients will leak some urine initially following guerra removal and that this will gradually improve with time      I spent over 25 minutes with the patient.  Over half this time was spent on counseling regarding the above.    Tristin Hughes   Urology  [facility]  Clinic Phone [phone]

## 2018-10-24 NOTE — MR AVS SNAPSHOT
After Visit Summary   10/24/2018    Pedro Dumont    MRN: [id number]           Patient Information     Date Of Birth          1950        Visit Information        Provider Department      10/24/2018 10:30 AM Tristin Hughes MD [facility] [place]        Today's Diagnoses     Prostate cancer (H)    -  1       Follow-ups after your visit        Future tests that were ordered for you today     Open Future Orders        Priority Expected Expires Ordered    PSA tumor marker Routine  10/24/2019 10/24/2018            Who to contact     If you have questions or need follow up information about today's clinic visit or your schedule please contact [facility] UROLOGY [facility] directly at [phone].  Normal or non-critical lab and imaging results will be communicated to you by D[employer], letter or phone within 4 business days after the clinic has received the results. If you do not hear from us within 7 days, please contact the clinic through Dinetouchhart or phone. If you have a critical or abnormal lab result, we will notify you by phone as soon as possible.  Submit refill requests through [employer] or call your pharmacy and they will forward the refill request to us. Please allow 3 business days for your refill to be completed.          Additional Information About Your Visit        MyChart Information     [employer] gives you secure access to your electronic health record. If you see a primary care provider, you can also send messages to your care team and make appointments. If you have questions, please call your primary care clinic.  If you do not have a primary care provider, please call [phone] and they will assist you.        Care EveryWhere ID     This is your Care EveryWhere ID. This could be used by other organizations to access your [facility] medical records  [id number]        Your Vitals Were     Pulse Height BMI (Body Mass Index)             66  "1.676 m (5' 6\") 30.51 kg/m2          Blood Pressure from Last 3 Encounters:   10/24/18 132/70   10/13/18 118/70   10/02/18 123/77    Weight from Last 3 Encounters:   10/24/18 85.7 kg (189 lb)   10/12/18 85.7 kg (189 lb)   10/02/18 86.5 kg (190 lb 9.6 oz)               Primary Care Provider Office Phone # Fax Jordan Casarez [phone]       [address] [address]        Equal Access to Services     MARK ANTHONY JOSE : Hadii quentin beltre hadasho Soomaali, waaxda luqadaha, qaybta kaalmada adekayleeyada, yamini ha . So [facility] [phone].    ATENCIÓN: Si habla español, tiene a deluna disposición servicios gratuitos de asistencia lingüística. Llame al [phone].    We comply with applicable federal civil rights laws and [place] laws. We do not discriminate on the basis of race, color, national origin, age, disability, sex, sexual orientation, or gender identity.            Thank you!     Thank you for choosing [facility] UROLOGY CLINIC [place]  for your care. Our goal is always to provide you with excellent care. Hearing back from our patients is one way we can continue to improve our services. Please take a few minutes to complete the written survey that you may receive in the mail after your visit with us. Thank you!             Your Updated Medication List - Protect others around you: Learn how to safely use, store and throw away your medicines at www.disposemymeds.org.          This list is accurate as of 10/24/18 10:47 AM.  Always use your most recent med list.                   Brand Name Dispense Instructions for use Diagnosis    aspirin 81 MG tablet      Take 81 mg by mouth daily        BENADRYL PO      Take 12.5-25 mg by mouth nightly as needed        CALCIUM 1200 PO      Take 1 tablet by mouth daily        CENTRUM CARDIO PO      Take 1 tablet by mouth daily        enoxaparin 40 MG/0.4ML injection    LOVENOX    11.2 mL    Inject " 0.4 mLs (40 mg) Subcutaneous daily for 28 days    Prostate cancer (H)       GLUCOSAMINE 1500 COMPLEX PO      Take 2 tablets by mouth daily        naproxen sodium 220 MG tablet    ANAPROX     Take 220 mg by mouth At Bedtime        OVER-THE-COUNTER      Place 1 drop into both eyes daily as needed (Dry Eyes) ROHTO Dry eye aid   Povidone 0.68% - Lubricant Propylene glycol 0.3% - Lubricant        oxyCODONE IR 5 MG tablet    ROXICODONE    20 tablet    Take 1 tablet (5 mg) by mouth every 6 hours as needed for pain    Prostate cancer (H)       polyethylene glycol powder    MIRALAX/GLYCOLAX     Take 17 g by mouth daily as needed        senna-docusate 8.6-50 MG per tablet    SENOKOT-S;PERICOLACE    60 tablet    Take 1 tablet by mouth 2 times daily To be taken with opioid (narcotic) pain medication to prevent constipation.    Prostate cancer (H)       SIMVASTATIN PO      Take 10 mg by mouth daily (0.5 x 20 mg = 10 mg dose)        TYLENOL PO      Take 1,000 mg by mouth every morning

## 2018-10-24 NOTE — NURSING NOTE
Chief Complaint   Patient presents with     Clinic Care Coordination - [facility]     Prostatectomy      Silva Rojas LPN

## 2018-12-27 ENCOUNTER — OFFICE VISIT (OUTPATIENT)
Dept: UROLOGY | Facility: CLINIC | Age: 68
End: 2018-12-27
Payer: MEDICARE

## 2018-12-27 VITALS
BODY MASS INDEX: 30.49 KG/M2 | WEIGHT: 194.3 LBS | SYSTOLIC BLOOD PRESSURE: 124 MMHG | DIASTOLIC BLOOD PRESSURE: 81 MMHG | HEIGHT: 67 IN | OXYGEN SATURATION: 96 % | HEART RATE: 71 BPM

## 2018-12-27 DIAGNOSIS — N52.9 ERECTILE DYSFUNCTION, UNSPECIFIED ERECTILE DYSFUNCTION TYPE: ICD-10-CM

## 2018-12-27 DIAGNOSIS — C61 PROSTATE CANCER (H): ICD-10-CM

## 2018-12-27 DIAGNOSIS — C61 PROSTATE CANCER (H): Primary | ICD-10-CM

## 2018-12-27 LAB — PSA SERPL-MCNC: <0.01 UG/L (ref 0–4)

## 2018-12-27 PROCEDURE — 84153 ASSAY OF PSA TOTAL: CPT | Performed by: UROLOGY

## 2018-12-27 PROCEDURE — 99024 POSTOP FOLLOW-UP VISIT: CPT | Performed by: UROLOGY

## 2018-12-27 PROCEDURE — 36415 COLL VENOUS BLD VENIPUNCTURE: CPT | Performed by: UROLOGY

## 2018-12-27 RX ORDER — SILDENAFIL 100 MG/1
100 TABLET, FILM COATED ORAL DAILY PRN
Qty: 6 TABLET | Refills: 11 | Status: SHIPPED | OUTPATIENT
Start: 2018-12-27 | End: 2019-12-27

## 2018-12-27 ASSESSMENT — PAIN SCALES - GENERAL: PAINLEVEL: NO PAIN (0)

## 2018-12-27 ASSESSMENT — MIFFLIN-ST. JEOR: SCORE: 1599.97

## 2018-12-27 NOTE — NURSING NOTE
"Pedro Dumont's goals for this visit include:   Chief Complaint   Patient presents with     RECHECK     PSA f/u       He requests these members of his care team be copied on today's visit information: Yes    PCP: Gerald Casarez    Referring Provider:  No referring provider defined for this encounter.    /81 (BP Location: Left arm, Patient Position: Sitting, Cuff Size: Adult Regular)   Pulse 71   Ht 1.702 m (5' 7\")   Wt 88.1 kg (194 lb 4.8 oz)   SpO2 96%   BMI 30.43 kg/m      Do you need any medication refills at today's visit? No    "

## 2018-12-27 NOTE — PROGRESS NOTES
"[facility]    CHIEF COMPLAINT   It was my pleasure to see Pedro Dumont who is a 68 year old male for follow-up of prostate cancer.      HPI   Pedro Dumont is a very pleasant 68 year old male who presents with a history of prostate cancer s/p RALP, BPLND on 10/12/18. Pathology resulted with [place] 3+4=7 with tertiary pattern 5, margins negative, lymph nodes negative. He returns today for PSA.    Incontinence has gotten better gradually. No issues with leaking overnight or during the day unless stress with coughing or golfing. He has not attempted intercourse or erections.     PHYSICAL EXAM  Patient is a 68 year old  male   Vitals: Blood pressure 124/81, pulse 71, height 1.702 m (5' 7\"), weight 88.1 kg (194 lb 4.8 oz), SpO2 96 %.  General Appearance Adult: Body mass index is 30.43 kg/m .  Alert, no acute distress, oriented  HENT: throat/mouth:normal, good dentition  Lungs: no respiratory distress, or pursed lip breathing  Heart: No obvious jugular venous distension present  Abdomen: soft, nontender, no organomegaly or masses. Incisions clean, dry and intact with no evidence of hernia.   Musculoskeltal: extremities normal, no peripheral edema  Skin: no suspicious lesions or rashes  Neuro: Alert, oriented, speech and mentation normal  Psych: affect and mood normal  Gait: Normal    PATHOLOGY:  FINAL DIAGNOSIS:   <<<<<  A: Prostate, apical margin, excision   - Negative for malignancy     B: Prostate, radical prostatectomy   - Adenocarcinoma, [place]'s grade 3/4 with tertiary grade 5, resection   margins free of malignancy, benign   seminal vesicles, benign lymph nodes (2)    PSA   Date Value Ref Range Status   12/27/2018 <0.01 0 - 4 ug/L Final     Comment:     Assay Method:  Chemiluminescence using Siemens Vista analyzer        ASSESSMENT and PLAN  67 year old man with history of Khadar 3+4=7 with tertiary pattern 5 prostate cancer s/p RALP, BPLND on 10/12. Margins and lymph nodes negative. PSA undetectable.    - F/U in 6 " months in [place] with PSA prior  - We discussed that his urinary leakage will continue to improve with time  - We discussed that he may start to attempt erections and script for sildenafil 100mg (Viagra) provided      I spent over 25 minutes with the patient.  Over half this time was spent on counseling regarding the above.    Tristin Hughes   Urology  [facility]  Clinic Phone [phone]

## 2018-12-27 NOTE — LETTER
"    12/27/2018         RE: Pedro Dumont  [address] [address]  [facility] [address]        Dear Colleague,    Thank you for referring your patient, Pedro Dumont, to the [facility]. Please see a copy of my visit note below.    [facility]    CHIEF COMPLAINT   It was my pleasure to see Pedro Dumont who is a 68 year old male for follow-up of prostate cancer.      HPI   ePdro Dumont is a very pleasant 68 year old male who presents with a history of prostate cancer s/p RALP, BPLND on 10/12/18. Pathology resulted with Khadar 3+4=7 with tertiary pattern 5, margins negative, lymph nodes negative. He returns today for PSA.    Incontinence has gotten better gradually. No issues with leaking overnight or during the day unless stress with coughing or golfing. He has not attempted intercourse or erections.     PHYSICAL EXAM  Patient is a 68 year old  male   Vitals: Blood pressure 124/81, pulse 71, height 1.702 m (5' 7\"), weight 88.1 kg (194 lb 4.8 oz), SpO2 96 %.  General Appearance Adult: Body mass index is 30.43 kg/m .  Alert, no acute distress, oriented  HENT: throat/mouth:normal, good dentition  Lungs: no respiratory distress, or pursed lip breathing  Heart: No obvious jugular venous distension present  Abdomen: soft, nontender, no organomegaly or masses. Incisions clean, dry and intact with no evidence of hernia.   Musculoskeltal: extremities normal, no peripheral edema  Skin: no suspicious lesions or rashes  Neuro: Alert, oriented, speech and mentation normal  Psych: affect and mood normal  Gait: Normal    PATHOLOGY:  FINAL DIAGNOSIS:   <<<<<  A: Prostate, apical margin, excision   - Negative for malignancy     B: Prostate, radical prostatectomy   - Adenocarcinoma, Khadar's grade 3/4 with tertiary grade 5, resection   margins free of malignancy, benign   seminal vesicles, benign lymph nodes (2)    PSA   Date Value Ref Range Status   12/27/2018 <0.01 0 - 4 ug/L Final     Comment:     Assay Method:  " Chemiluminescence using Siemens Vista analyzer        ASSESSMENT and PLAN  67 year old man with history of Khadar 3+4=7 with tertiary pattern 5 prostate cancer s/p RALP, BPLND on 10/12. Margins and lymph nodes negative. PSA undetectable.    - F/U in 6 months in [place] with PSA prior  - We discussed that his urinary leakage will continue to improve with time  - We discussed that he may start to attempt erections and script for sildenafil 100mg (Viagra) provided      I spent over 25 minutes with the patient.  Over half this time was spent on counseling regarding the above.    Tristin Hughes   Urology  [facility]  Clinic Phone [phone]        Again, thank you for allowing me to participate in the care of your patient.        Sincerely,        Tristin Hughes MD

## 2019-05-10 ENCOUNTER — OFFICE VISIT (OUTPATIENT)
Dept: UROLOGY | Facility: CLINIC | Age: 69
End: 2019-05-10
Payer: MEDICARE

## 2019-05-10 VITALS — HEART RATE: 75 BPM | DIASTOLIC BLOOD PRESSURE: 80 MMHG | SYSTOLIC BLOOD PRESSURE: 122 MMHG

## 2019-05-10 DIAGNOSIS — C61 PROSTATE CANCER (H): ICD-10-CM

## 2019-05-10 DIAGNOSIS — N39.3 MALE URINARY STRESS INCONTINENCE: ICD-10-CM

## 2019-05-10 DIAGNOSIS — C61 PROSTATE CANCER (H): Primary | ICD-10-CM

## 2019-05-10 LAB — PSA SERPL-MCNC: <0.01 UG/L (ref 0–4)

## 2019-05-10 PROCEDURE — 36415 COLL VENOUS BLD VENIPUNCTURE: CPT | Performed by: UROLOGY

## 2019-05-10 PROCEDURE — 84153 ASSAY OF PSA TOTAL: CPT | Performed by: UROLOGY

## 2019-05-10 PROCEDURE — 99214 OFFICE O/P EST MOD 30 MIN: CPT | Performed by: UROLOGY

## 2019-05-10 NOTE — PROGRESS NOTES
[facility]    CHIEF COMPLAINT   It was my pleasure to see Pedro Dumont who is a 68 year old male for follow-up of prostate cancer.      HPI   Pedro Dumont is a very pleasant 68 year old male who presents with a history of prostate cancer s/p RALP, BPLND on 10/12/18. Pathology resulted with Khadar 3+4=7 with tertiary pattern 5, margins negative, lymph nodes negative. He returns today for PSA.    Initial PSA: 9.1  Biopsy Greenfield Score: 5+4=9  Risk Group: High  Status post RALP/BPLND on 10/12/2019  Pathologic Greenfield Score: 3+4=7 with tertiary pattern 5  Pathologic Stage: pT2  Positive Margins: None  Number of Lymph Nodes Removed: 2  Number of Positive Lymph Nodes: 0    Incontinence has stabilized. No issues with leaking overnight or during the day when sitting, however he continues to have bothersome stress incontinence with coughing or golfing. He has not attempted intercourse or erections. He reports using 1-2 depends per day.     PHYSICAL EXAM  Patient is a 68 year old  male   Vitals: Blood pressure 122/80, pulse 75.  General Appearance Adult: There is no height or weight on file to calculate BMI.  Alert, no acute distress, oriented  HENT: throat/mouth:normal, good dentition  Lungs: no respiratory distress, or pursed lip breathing  Heart: No obvious jugular venous distension present  Abdomen: soft, nontender, no organomegaly or masses. Incisions clean, dry and intact with no evidence of hernia.   Musculoskeltal: extremities normal, no peripheral edema  Skin: no suspicious lesions or rashes  Neuro: Alert, oriented, speech and mentation normal  Psych: affect and mood normal  Gait: Normal    PATHOLOGY:  FINAL DIAGNOSIS:   <<<<<  A: Prostate, apical margin, excision   - Negative for malignancy     B: Prostate, radical prostatectomy   - Adenocarcinoma, [place]'s grade 3/4 with tertiary grade 5, resection   margins free of malignancy, benign   seminal vesicles, benign lymph nodes (2)    Component PSA   Latest Ref Rng &  Units 0 - 4 ug/L   12/27/2018 <0.01   5/10/2019 <0.01       ASSESSMENT and PLAN  67 year old man with history of [place] 3+4=7 with tertiary pattern 5 prostate cancer s/p RALP, BPLND on 10/12. Margins and lymph nodes negative. PSA undetectable.    - F/U in Early September in [place] with PSA prior  - We discussed that his urinary incontinence may continue to gradually improve, however he may only have incremental further.  We discussed the use of pelvic floor physical therapy, specifically focusing Keagle's exercises and we discussed referral to physical therapy however he would rather have this done locally in [place].  I advised that he reach out to his primary care doctor to arrange this.  - Plan for follow-up in early September with repeat PSA prior.  We discussed options for stress urinary incontinence management with possible referral to Dr. Sheppard or Dr. Mayorga or for consideration of a male sling versus artificial urinary sphincter.  If this is necessary he would hope to complete this prior to their wintering in [place].      I spent over 25 minutes with the patient.  Over half this time was spent on counseling regarding the above.    Tristin Hughes   Urology  [facility]  Clinic Phone [phone]

## 2019-05-10 NOTE — LETTER
5/10/2019         RE: Pedro Dumont  [address] [address]  [facility] [address]        Dear Colleague,    Thank you for referring your patient, Pedro Dumont, to the [facility]. Please see a copy of my visit note below.    [facility] [place]    CHIEF COMPLAINT   It was my pleasure to see Pedro Dumont who is a 68 year old male for follow-up of prostate cancer.      HPI   Pedro Dumont is a very pleasant 68 year old male who presents with a history of prostate cancer s/p RALP, BPLND on 10/12/18. Pathology resulted with [place] 3+4=7 with tertiary pattern 5, margins negative, lymph nodes negative. He returns today for PSA.    Initial PSA: 9.1  Biopsy [place] Score: 5+4=9  Risk Group: High  Status post RALP/BPLND on 10/12/2019  Pathologic Khadar Score: 3+4=7 with tertiary pattern 5  Pathologic Stage: pT2  Positive Margins: None  Number of Lymph Nodes Removed: 2  Number of Positive Lymph Nodes: 0    Incontinence has stabilized. No issues with leaking overnight or during the day when sitting, however he continues to have bothersome stress incontinence with coughing or golfing. He has not attempted intercourse or erections. He reports using 1-2 depends per day.     PHYSICAL EXAM  Patient is a 68 year old  male   Vitals: Blood pressure 122/80, pulse 75.  General Appearance Adult: There is no height or weight on file to calculate BMI.  Alert, no acute distress, oriented  HENT: throat/mouth:normal, good dentition  Lungs: no respiratory distress, or pursed lip breathing  Heart: No obvious jugular venous distension present  Abdomen: soft, nontender, no organomegaly or masses. Incisions clean, dry and intact with no evidence of hernia.   Musculoskeltal: extremities normal, no peripheral edema  Skin: no suspicious lesions or rashes  Neuro: Alert, oriented, speech and mentation normal  Psych: affect and mood normal  Gait: Normal    PATHOLOGY:  FINAL DIAGNOSIS:   <<<<<  A: Prostate, apical margin, excision   -  Negative for malignancy     B: Prostate, radical prostatectomy   - Adenocarcinoma, Khadar's grade 3/4 with tertiary grade 5, resection   margins free of malignancy, benign   seminal vesicles, benign lymph nodes (2)    Component PSA   Latest Ref Rng & Units 0 - 4 ug/L   12/27/2018 <0.01   5/10/2019 <0.01       ASSESSMENT and PLAN  67 year old man with history of Khadar 3+4=7 with tertiary pattern 5 prostate cancer s/p RALP, BPLND on 10/12. Margins and lymph nodes negative. PSA undetectable.    - F/U in Early September in [place] with PSA prior  - We discussed that his urinary incontinence may continue to gradually improve, however he may only have incremental further.  We discussed the use of pelvic floor physical therapy, specifically focusing Keagle's exercises and we discussed referral to physical therapy however he would rather have this done locally in [place].  I advised that he reach out to his primary care doctor to arrange this.  - Plan for follow-up in early September with repeat PSA prior.  We discussed options for stress urinary incontinence management with possible referral to Dr. Sheppard or Dr. Mayorga or for consideration of a male sling versus artificial urinary sphincter.  If this is necessary he would hope to complete this prior to their wintering in [place].      I spent over 25 minutes with the patient.  Over half this time was spent on counseling regarding the above.    Tristin Hughes   Urology  [facility]  Clinic Phone [phone]        Again, thank you for allowing me to participate in the care of your patient.        Sincerely,        Tristin Hughes MD

## 2019-05-10 NOTE — NURSING NOTE
Pedro Dumont's goals for this visit include: surgery follow up  He requests these members of his care team be copied on today's visit information: no    PCP: Gerald Casarez    Referring Provider:  No referring provider defined for this encounter.    There were no vitals taken for this visit.    Do you need any medication refills at today's visit? No    Lottie Bolton LPN

## 2019-05-13 ENCOUNTER — TELEPHONE (OUTPATIENT)
Dept: UROLOGY | Facility: CLINIC | Age: 69
End: 2019-05-13

## 2019-05-13 NOTE — TELEPHONE ENCOUNTER
[facility] Call Center    Phone Message    May a detailed message be left on voicemail: yes    Reason for Call: Other: [facility] care team member of Dr. Casarez PCP from [facility] is requesting to discuss patients case/plan with Dr. Hughes since last office visit on Friday with Dr. Hughes.     Sara saw Dr. Hughes and PCP wants to know what therapy Dr. Hughes is recommending/requesting for patient.     Fax over office visit notes to PCP: [phone]  Ph. [phone]    Action Taken: Message routed to:  Adult Clinics: Urology p [id number]

## 2019-05-14 NOTE — TELEPHONE ENCOUNTER
5/10/19 office note from Dr. Hughes successfully faxed to PCP Dr. Casarez at fax #[phone]. Will close encounter at this time.    Lisset Kolb RN, BSN

## 2019-08-26 DIAGNOSIS — C61 PROSTATE CANCER (H): Primary | ICD-10-CM

## 2019-08-26 NOTE — PROGRESS NOTES
Pt scheduled for f/u with Dr. Hughes and pt to complete PSA prior. Routing to Dr. Hughes to request orders be placed for PSA.    Rajani Moise LPN

## 2019-08-27 LAB — PSA SERPL-MCNC: NORMAL UG/L (ref 0–4)

## 2019-09-03 ENCOUNTER — DOCUMENTATION ONLY (OUTPATIENT)
Dept: LAB | Facility: CLINIC | Age: 69
End: 2019-09-03

## 2019-09-03 DIAGNOSIS — C61 PROSTATE CANCER (H): Primary | ICD-10-CM

## 2019-09-05 ENCOUNTER — OFFICE VISIT (OUTPATIENT)
Dept: UROLOGY | Facility: CLINIC | Age: 69
End: 2019-09-05
Payer: MEDICARE

## 2019-09-05 VITALS
OXYGEN SATURATION: 97 % | HEART RATE: 69 BPM | WEIGHT: 196.5 LBS | DIASTOLIC BLOOD PRESSURE: 75 MMHG | SYSTOLIC BLOOD PRESSURE: 123 MMHG | RESPIRATION RATE: 18 BRPM | BODY MASS INDEX: 30.78 KG/M2 | TEMPERATURE: 97.1 F

## 2019-09-05 DIAGNOSIS — C61 PROSTATE CANCER (H): ICD-10-CM

## 2019-09-05 DIAGNOSIS — C61 PROSTATE CANCER (H): Primary | ICD-10-CM

## 2019-09-05 LAB — PSA SERPL-MCNC: <0.01 UG/L (ref 0–4)

## 2019-09-05 PROCEDURE — 99214 OFFICE O/P EST MOD 30 MIN: CPT | Performed by: UROLOGY

## 2019-09-05 PROCEDURE — 36415 COLL VENOUS BLD VENIPUNCTURE: CPT | Performed by: UROLOGY

## 2019-09-05 PROCEDURE — 84153 ASSAY OF PSA TOTAL: CPT | Performed by: UROLOGY

## 2019-09-05 ASSESSMENT — PAIN SCALES - GENERAL: PAINLEVEL: NO PAIN (0)

## 2019-09-05 NOTE — PROGRESS NOTES
[facility]    CHIEF COMPLAINT   It was my pleasure to see Pedro Dumont who is a 68 year old male for follow-up of prostate cancer.      HPI   Pedro Dumont is a very pleasant 68 year old male who presents with a history of prostate cancer s/p RALP, BPLND on 10/12/18. Pathology resulted with Khadar 3+4=7 with tertiary pattern 5, margins negative, lymph nodes negative. He returns today for PSA.    Initial PSA: 9.1  Biopsy Bennet Score: 5+4=9  Risk Group: High  Status post RALP/BPLND on 10/12/2018  Pathologic Bennet Score: 3+4=7 with tertiary pattern 5  Pathologic Stage: pT2  Positive Margins: None  Number of Lymph Nodes Removed: 2  Number of Positive Lymph Nodes: 0    5/10/19  Incontinence has stabilized. No issues with leaking overnight or during the day when sitting, however he continues to have bothersome stress incontinence with coughing or golfing. He has not attempted intercourse or erections. He reports using 1-2 depends per day.     TODAY:  Continues to have stress incontinence.  No leakage with sitting or sleeping.   He continues to use 1-2 ppd depending on activity.   He did do a lot of physical therapy   Still not attempted erections due to concern with leakage. He has ordered a penile pump.     PHYSICAL EXAM  Patient is a 68 year old  male   Vitals: There were no vitals taken for this visit.  General Appearance Adult: There is no height or weight on file to calculate BMI.  Alert, no acute distress, oriented  HENT: throat/mouth:normal, good dentition  Lungs: no respiratory distress, or pursed lip breathing  Heart: No obvious jugular venous distension present  Abdomen: soft, nontender, no organomegaly or masses. Incisions clean, dry and intact with no evidence of hernia.   Musculoskeltal: extremities normal, no peripheral edema  Skin: no suspicious lesions or rashes  Neuro: Alert, oriented, speech and mentation normal  Psych: affect and mood normal  Gait: Normal    PATHOLOGY:  FINAL DIAGNOSIS:   <<<<<  A:  Prostate, apical margin, excision   - Negative for malignancy     B: Prostate, radical prostatectomy   - Adenocarcinoma, Khadar's grade 3/4 with tertiary grade 5, resection   margins free of malignancy, benign   seminal vesicles, benign lymph nodes (2)    Component PSA   Latest Ref Rng & Units 0 - 4 ug/L   12/27/2018 <0.01   5/10/2019 <0.01   8/26/2019 Canceled, Test credited   9/5/2019 <0.01       ASSESSMENT and PLAN  67 year old man with history of Khadar 3+4=7 with tertiary pattern 5 prostate cancer s/p RALP, BPLND on 10/12/18. Margins and lymph nodes negative. PSA undetectable.    - We discussed that his urinary incontinence may continue to gradually improve, however he may only have incremental further.  He has done will with physical therapy and should continue with this  - We discussed that he may not see any additional benefit after ~1 year and that given his level of stress incontinence would be a candidate for either a male sling or AUS surgery  - He and his wife have researched this extensively and are planning to establish care with a Urologist in [place]. They are in [place], [place] from October - May. We discussed the importance of finding a urologist who does a high volume of either slings or AUS surgery  - He will plan to have his PSA drawn in 6 months in [place]  - F/U in 1 year with me for PSA.    I spent over 25 minutes with the patient.  Over half this time was spent on counseling regarding the above.    Tristin Hughes   Urology  [facility]  Clinic Phone [phone]

## 2019-09-05 NOTE — NURSING NOTE
Pedro Dumont's goals for this visit include:   Chief Complaint   Patient presents with     RECHECK     review and distribute survivorship care plan. follow up with PSA before       He requests these members of his care team be copied on today's visit information: Yes    PCP: Gerald Casarez    Referring Provider:  No referring provider defined for this encounter.    /75 (BP Location: Left arm, Patient Position: Sitting, Cuff Size: Adult Large)   Pulse 69   Temp 97.1  F (36.2  C) (Oral)   Resp 18   Wt 89.1 kg (196 lb 8 oz)   SpO2 97%   BMI 30.78 kg/m      Do you need any medication refills at today's visit? No    Tiburcio Arguelles CMA ([facility])

## 2019-09-05 NOTE — LETTER
9/5/2019         RE: Pedro Dumont  [address] [address]  [facility] [address]        Dear Colleague,    Thank you for referring your patient, Pedro Dumont, to the [facility]. Please see a copy of my visit note below.    [facility]    CHIEF COMPLAINT   It was my pleasure to see Pedro Dumont who is a 68 year old male for follow-up of prostate cancer.      HPI   Pedro Dumont is a very pleasant 68 year old male who presents with a history of prostate cancer s/p RALP, BPLND on 10/12/18. Pathology resulted with [place] 3+4=7 with tertiary pattern 5, margins negative, lymph nodes negative. He returns today for PSA.    Initial PSA: 9.1  Biopsy [place] Score: 5+4=9  Risk Group: High  Status post RALP/BPLND on 10/12/2018  Pathologic [place] Score: 3+4=7 with tertiary pattern 5  Pathologic Stage: pT2  Positive Margins: None  Number of Lymph Nodes Removed: 2  Number of Positive Lymph Nodes: 0    5/10/19  Incontinence has stabilized. No issues with leaking overnight or during the day when sitting, however he continues to have bothersome stress incontinence with coughing or golfing. He has not attempted intercourse or erections. He reports using 1-2 depends per day.     TODAY:  Continues to have stress incontinence.  No leakage with sitting or sleeping.   He continues to use 1-2 ppd depending on activity.   He did do a lot of physical therapy   Still not attempted erections due to concern with leakage. He has ordered a penile pump.     PHYSICAL EXAM  Patient is a 68 year old  male   Vitals: There were no vitals taken for this visit.  General Appearance Adult: There is no height or weight on file to calculate BMI.  Alert, no acute distress, oriented  HENT: throat/mouth:normal, good dentition  Lungs: no respiratory distress, or pursed lip breathing  Heart: No obvious jugular venous distension present  Abdomen: soft, nontender, no organomegaly or masses. Incisions clean, dry and intact with no evidence of  hernia.   Musculoskeltal: extremities normal, no peripheral edema  Skin: no suspicious lesions or rashes  Neuro: Alert, oriented, speech and mentation normal  Psych: affect and mood normal  Gait: Normal    PATHOLOGY:  FINAL DIAGNOSIS:   <<<<<  A: Prostate, apical margin, excision   - Negative for malignancy     B: Prostate, radical prostatectomy   - Adenocarcinoma, Khadar's grade 3/4 with tertiary grade 5, resection   margins free of malignancy, benign   seminal vesicles, benign lymph nodes (2)    Component PSA   Latest Ref Rng & Units 0 - 4 ug/L   12/27/2018 <0.01   5/10/2019 <0.01   8/26/2019 Canceled, Test credited   9/5/2019 <0.01       ASSESSMENT and PLAN  67 year old man with history of [place] 3+4=7 with tertiary pattern 5 prostate cancer s/p RALP, BPLND on 10/12/18. Margins and lymph nodes negative. PSA undetectable.    - We discussed that his urinary incontinence may continue to gradually improve, however he may only have incremental further.  He has done will with physical therapy and should continue with this  - We discussed that he may not see any additional benefit after ~1 year and that given his level of stress incontinence would be a candidate for either a male sling or AUS surgery  - He and his wife have researched this extensively and are planning to establish care with a Urologist in [place]. They are in [place], [place] from October - May. We discussed the importance of finding a urologist who does a high volume of either slings or AUS surgery  - He will plan to have his PSA drawn in 6 months in [place]  - F/U in 1 year with me for PSA.    I spent over 25 minutes with the patient.  Over half this time was spent on counseling regarding the above.    Tristin Hughes   Urology  [facility]  Clinic Phone [phone]        Again, thank you for allowing me to participate in the care of your patient.        Sincerely,        Tristin Hughes MD

## 2019-09-09 ENCOUNTER — TELEPHONE (OUTPATIENT)
Dept: UROLOGY | Facility: CLINIC | Age: 69
End: 2019-09-09

## 2019-09-09 NOTE — TELEPHONE ENCOUNTER
----- Message from Tristin Hughes MD sent at 9/5/2019 10:32 AM CDT -----  Please contact the patient to inform them of their normal PSA results.    Plan - unchanged from clinic visit  - PSA in 6 months in [place]  - F/U in 1 year with me for PSA    Thanks,  Tristin Hughes MD

## 2020-03-11 ENCOUNTER — HEALTH MAINTENANCE LETTER (OUTPATIENT)
Age: 70
End: 2020-03-11

## 2021-01-03 ENCOUNTER — HEALTH MAINTENANCE LETTER (OUTPATIENT)
Age: 71
End: 2021-01-03

## 2021-04-25 ENCOUNTER — HEALTH MAINTENANCE LETTER (OUTPATIENT)
Age: 71
End: 2021-04-25

## 2021-10-10 ENCOUNTER — HEALTH MAINTENANCE LETTER (OUTPATIENT)
Age: 71
End: 2021-10-10

## 2022-05-21 ENCOUNTER — HEALTH MAINTENANCE LETTER (OUTPATIENT)
Age: 72
End: 2022-05-21

## 2022-09-18 ENCOUNTER — HEALTH MAINTENANCE LETTER (OUTPATIENT)
Age: 72
End: 2022-09-18

## 2023-10-08 ENCOUNTER — HEALTH MAINTENANCE LETTER (OUTPATIENT)
Age: 73
End: 2023-10-08

## (undated) DEVICE — SURGICEL POWDER ABSORBABLE HEMOSTAT 3GM 3013SP

## (undated) DEVICE — DRAIN JACKSON PRATT RESERVOIR 100ML SU130-1305

## (undated) DEVICE — SPONGE RAY-TEC 4X8" 7318

## (undated) DEVICE — PACK DAVINCI UROLOGY SBA15UDFSG

## (undated) DEVICE — GOWN IMPERVIOUS SPECIALTY XL/XLONG 39049

## (undated) DEVICE — LUBRICATING JELLY 4.25OZ

## (undated) DEVICE — ENDO TROCAR FIRST ENTRY KII FIOS Z-THRD 12X100MM CTF73

## (undated) DEVICE — DAVINCI S FCP BIPOLAR FENESTRATED 420205

## (undated) DEVICE — SU VICRYL 0 UR-6 27" J603H

## (undated) DEVICE — SU MONOCRYL 4-0 PS-2 18" UND Y496G

## (undated) DEVICE — ENDO POUCH UNIV RETRIEVAL SYSTEM INZII 10MM CD001

## (undated) DEVICE — DRAIN JACKSON PRATT CHANNEL 19FR ROUND HUBLESS SIL JP-2230

## (undated) DEVICE — SU MONOCRYL 0 CT-1 27IN

## (undated) DEVICE — SU SILK 2-0 FSL 18" 677G

## (undated) DEVICE — DAVINCI S MONOPOLAR SCISSORS PRECURVED HOT SHEARS 420179

## (undated) DEVICE — CLIP ENDO HEMO-LOC PURPLE LG 544240

## (undated) DEVICE — TROCAR AIRSEAL BLADELESS 12X120MM IAS12-120

## (undated) DEVICE — SU WND CLOSURE VLOC 90 ABS 3-0 VIOLET 6" CV-23 VLOCM0804

## (undated) DEVICE — DAVINCI S NDL DRIVER LARGE 420006

## (undated) DEVICE — TUBING CONMED AIRSEAL SMOKE EVAC INSUFFLATION ASM-EVAC

## (undated) DEVICE — SOL NACL 0.9% INJ 1000ML BAG 2B1324X

## (undated) DEVICE — GLOVE PROTEXIS BLUE W/NEU-THERA 8.0  2D73EB80

## (undated) DEVICE — SU VICRYL 0 CT-1 27" J340H

## (undated) DEVICE — SU WND CLOSURE V-LOC 3-0 CV-23 6" VLOCM1904

## (undated) DEVICE — ESU CORD MONOPOLAR 10'  E0510

## (undated) DEVICE — GLOVE PROTEXIS MICRO 7.5  2D73PM75

## (undated) DEVICE — GLOVE PROTEXIS BLUE W/NEU-THERA 6.5  2D73EB65

## (undated) DEVICE — TAPE CLOTH ADHESIVE 3" LATEX FREE

## (undated) DEVICE — DAVINCI DRAPE KIT 4-ARM 420291

## (undated) DEVICE — LINEN TOWEL PACK X5 5464

## (undated) DEVICE — NDL INSUFFLATION 120MM VERRES 172015

## (undated) DEVICE — ENDO APPLICATOR SURGIFLO PLASMA COBLATION MS1995

## (undated) DEVICE — ENDO SHEARS EXTRA LONG 5MM 174601

## (undated) DEVICE — DAVINCI HOT SHEARS TIP COVER  400180

## (undated) DEVICE — KIT PATIENT POSITIONING PIGAZZI LATEX FREE 40580

## (undated) DEVICE — CATH HOLDER STRAP 36600

## (undated) DEVICE — SOL WATER IRRIG 1000ML BOTTLE 2F7114

## (undated) RX ORDER — NEOSTIGMINE METHYLSULFATE 1 MG/ML
VIAL (ML) INJECTION
Status: DISPENSED
Start: 2018-10-12

## (undated) RX ORDER — GLYCOPYRROLATE 0.2 MG/ML
INJECTION, SOLUTION INTRAMUSCULAR; INTRAVENOUS
Status: DISPENSED
Start: 2018-10-12

## (undated) RX ORDER — LIDOCAINE HYDROCHLORIDE 20 MG/ML
INJECTION, SOLUTION EPIDURAL; INFILTRATION; INTRACAUDAL; PERINEURAL
Status: DISPENSED
Start: 2018-10-12

## (undated) RX ORDER — BUPIVACAINE HYDROCHLORIDE 2.5 MG/ML
INJECTION, SOLUTION EPIDURAL; INFILTRATION; INTRACAUDAL
Status: DISPENSED
Start: 2018-10-12

## (undated) RX ORDER — ONDANSETRON 2 MG/ML
INJECTION INTRAMUSCULAR; INTRAVENOUS
Status: DISPENSED
Start: 2018-10-12

## (undated) RX ORDER — DEXAMETHASONE SODIUM PHOSPHATE 4 MG/ML
INJECTION, SOLUTION INTRA-ARTICULAR; INTRALESIONAL; INTRAMUSCULAR; INTRAVENOUS; SOFT TISSUE
Status: DISPENSED
Start: 2018-10-12

## (undated) RX ORDER — CEFAZOLIN SODIUM 2 G/100ML
INJECTION, SOLUTION INTRAVENOUS
Status: DISPENSED
Start: 2018-10-12

## (undated) RX ORDER — HYDROMORPHONE HYDROCHLORIDE 1 MG/ML
INJECTION, SOLUTION INTRAMUSCULAR; INTRAVENOUS; SUBCUTANEOUS
Status: DISPENSED
Start: 2018-10-12

## (undated) RX ORDER — PROPOFOL 10 MG/ML
INJECTION, EMULSION INTRAVENOUS
Status: DISPENSED
Start: 2018-10-12

## (undated) RX ORDER — CEFAZOLIN SODIUM 1 G/3ML
INJECTION, POWDER, FOR SOLUTION INTRAMUSCULAR; INTRAVENOUS
Status: DISPENSED
Start: 2018-10-12

## (undated) RX ORDER — FENTANYL CITRATE 50 UG/ML
INJECTION, SOLUTION INTRAMUSCULAR; INTRAVENOUS
Status: DISPENSED
Start: 2018-10-12

## (undated) RX ORDER — HEPARIN SODIUM 5000 [USP'U]/.5ML
INJECTION, SOLUTION INTRAVENOUS; SUBCUTANEOUS
Status: DISPENSED
Start: 2018-10-12

## (undated) RX ORDER — VECURONIUM BROMIDE 1 MG/ML
INJECTION, POWDER, LYOPHILIZED, FOR SOLUTION INTRAVENOUS
Status: DISPENSED
Start: 2018-10-12